# Patient Record
Sex: FEMALE | Race: WHITE | HISPANIC OR LATINO | Employment: FULL TIME | URBAN - METROPOLITAN AREA
[De-identification: names, ages, dates, MRNs, and addresses within clinical notes are randomized per-mention and may not be internally consistent; named-entity substitution may affect disease eponyms.]

---

## 2020-02-11 ENCOUNTER — APPOINTMENT (OUTPATIENT)
Dept: INTERPRETER SERVICES | Facility: CLINIC | Age: 35
End: 2020-02-11
Payer: COMMERCIAL

## 2020-02-13 RX ORDER — METFORMIN HCL 500 MG
1000 TABLET, EXTENDED RELEASE 24 HR ORAL 2 TIMES DAILY WITH MEALS
Status: ON HOLD | COMMUNITY
Start: 2019-09-11 | End: 2022-03-15

## 2020-02-13 ASSESSMENT — MIFFLIN-ST. JEOR: SCORE: 1398.49

## 2020-02-14 ENCOUNTER — HOSPITAL ENCOUNTER (OUTPATIENT)
Facility: CLINIC | Age: 35
Discharge: HOME | End: 2020-02-14
Attending: OBSTETRICS & GYNECOLOGY | Admitting: OBSTETRICS & GYNECOLOGY
Payer: COMMERCIAL

## 2020-02-14 ENCOUNTER — ANESTHESIA EVENT (OUTPATIENT)
Dept: SURGERY | Facility: CLINIC | Age: 35
End: 2020-02-14
Payer: COMMERCIAL

## 2020-02-14 ENCOUNTER — HOSPITAL ENCOUNTER (OUTPATIENT)
Dept: ULTRASOUND IMAGING | Facility: CLINIC | Age: 35
End: 2020-02-14
Attending: OBSTETRICS & GYNECOLOGY | Admitting: OBSTETRICS & GYNECOLOGY
Payer: COMMERCIAL

## 2020-02-14 ENCOUNTER — ANESTHESIA (OUTPATIENT)
Dept: SURGERY | Facility: CLINIC | Age: 35
End: 2020-02-14
Payer: COMMERCIAL

## 2020-02-14 VITALS
RESPIRATION RATE: 16 BRPM | HEIGHT: 61 IN | BODY MASS INDEX: 31.72 KG/M2 | HEART RATE: 58 BPM | SYSTOLIC BLOOD PRESSURE: 110 MMHG | DIASTOLIC BLOOD PRESSURE: 70 MMHG | WEIGHT: 168 LBS | OXYGEN SATURATION: 99 % | TEMPERATURE: 99.5 F

## 2020-02-14 DIAGNOSIS — Z98.890 S/P DILATION AND CURETTAGE: Primary | ICD-10-CM

## 2020-02-14 DIAGNOSIS — O02.1 MISSED AB: ICD-10-CM

## 2020-02-14 LAB
ABO + RH BLD: NORMAL
ABO + RH BLD: NORMAL
BLD GP AB SCN SERPL QL: NORMAL
BLOOD BANK CMNT PATIENT-IMP: NORMAL
GLUCOSE BLDC GLUCOMTR-MCNC: 82 MG/DL (ref 70–99)
GLUCOSE BLDC GLUCOMTR-MCNC: 99 MG/DL (ref 70–99)
SPECIMEN EXP DATE BLD: NORMAL

## 2020-02-14 PROCEDURE — 40000985 US INTRAOPERATIVE: Mod: TC

## 2020-02-14 PROCEDURE — 86850 RBC ANTIBODY SCREEN: CPT | Performed by: OBSTETRICS & GYNECOLOGY

## 2020-02-14 PROCEDURE — 27210794 ZZH OR GENERAL SUPPLY STERILE: Performed by: OBSTETRICS & GYNECOLOGY

## 2020-02-14 PROCEDURE — 86900 BLOOD TYPING SEROLOGIC ABO: CPT | Performed by: OBSTETRICS & GYNECOLOGY

## 2020-02-14 PROCEDURE — 25000125 ZZHC RX 250: Performed by: OBSTETRICS & GYNECOLOGY

## 2020-02-14 PROCEDURE — 88305 TISSUE EXAM BY PATHOLOGIST: CPT | Performed by: OBSTETRICS & GYNECOLOGY

## 2020-02-14 PROCEDURE — 71000012 ZZH RECOVERY PHASE 1 LEVEL 1 FIRST HR: Performed by: OBSTETRICS & GYNECOLOGY

## 2020-02-14 PROCEDURE — 25800030 ZZH RX IP 258 OP 636: Performed by: ANESTHESIOLOGY

## 2020-02-14 PROCEDURE — 71000027 ZZH RECOVERY PHASE 2 EACH 15 MINS: Performed by: OBSTETRICS & GYNECOLOGY

## 2020-02-14 PROCEDURE — 37000009 ZZH ANESTHESIA TECHNICAL FEE, EACH ADDTL 15 MIN: Performed by: OBSTETRICS & GYNECOLOGY

## 2020-02-14 PROCEDURE — 36000052 ZZH SURGERY LEVEL 2 EA 15 ADDTL MIN: Performed by: OBSTETRICS & GYNECOLOGY

## 2020-02-14 PROCEDURE — 36000050 ZZH SURGERY LEVEL 2 1ST 30 MIN: Performed by: OBSTETRICS & GYNECOLOGY

## 2020-02-14 PROCEDURE — 25000128 H RX IP 250 OP 636: Performed by: NURSE ANESTHETIST, CERTIFIED REGISTERED

## 2020-02-14 PROCEDURE — 88305 TISSUE EXAM BY PATHOLOGIST: CPT | Mod: 26 | Performed by: OBSTETRICS & GYNECOLOGY

## 2020-02-14 PROCEDURE — 25000132 ZZH RX MED GY IP 250 OP 250 PS 637: Performed by: OBSTETRICS & GYNECOLOGY

## 2020-02-14 PROCEDURE — 86901 BLOOD TYPING SEROLOGIC RH(D): CPT | Performed by: OBSTETRICS & GYNECOLOGY

## 2020-02-14 PROCEDURE — 36415 COLL VENOUS BLD VENIPUNCTURE: CPT | Performed by: OBSTETRICS & GYNECOLOGY

## 2020-02-14 PROCEDURE — 37000008 ZZH ANESTHESIA TECHNICAL FEE, 1ST 30 MIN: Performed by: OBSTETRICS & GYNECOLOGY

## 2020-02-14 PROCEDURE — 82962 GLUCOSE BLOOD TEST: CPT

## 2020-02-14 PROCEDURE — 25000125 ZZHC RX 250: Performed by: ANESTHESIOLOGY

## 2020-02-14 PROCEDURE — 40000306 ZZH STATISTIC PRE PROC ASSESS II: Performed by: OBSTETRICS & GYNECOLOGY

## 2020-02-14 PROCEDURE — 00000159 ZZHCL STATISTIC H-SEND OUTS PREP: Performed by: OBSTETRICS & GYNECOLOGY

## 2020-02-14 RX ORDER — ALBUTEROL SULFATE 0.83 MG/ML
2.5 SOLUTION RESPIRATORY (INHALATION) EVERY 4 HOURS PRN
Status: DISCONTINUED | OUTPATIENT
Start: 2020-02-14 | End: 2020-02-14 | Stop reason: HOSPADM

## 2020-02-14 RX ORDER — SODIUM CHLORIDE, SODIUM LACTATE, POTASSIUM CHLORIDE, CALCIUM CHLORIDE 600; 310; 30; 20 MG/100ML; MG/100ML; MG/100ML; MG/100ML
INJECTION, SOLUTION INTRAVENOUS CONTINUOUS
Status: DISCONTINUED | OUTPATIENT
Start: 2020-02-14 | End: 2020-02-14 | Stop reason: HOSPADM

## 2020-02-14 RX ORDER — ONDANSETRON 4 MG/1
4 TABLET, ORALLY DISINTEGRATING ORAL
Status: DISCONTINUED | OUTPATIENT
Start: 2020-02-14 | End: 2020-02-14 | Stop reason: HOSPADM

## 2020-02-14 RX ORDER — DOXYCYCLINE 100 MG/10ML
100 INJECTION, POWDER, LYOPHILIZED, FOR SOLUTION INTRAVENOUS
Status: COMPLETED | OUTPATIENT
Start: 2020-02-14 | End: 2020-02-14

## 2020-02-14 RX ORDER — AMOXICILLIN 250 MG
1-2 CAPSULE ORAL 2 TIMES DAILY PRN
Qty: 10 TABLET | Refills: 1 | Status: ON HOLD | OUTPATIENT
Start: 2020-02-14 | End: 2022-03-15

## 2020-02-14 RX ORDER — ONDANSETRON 2 MG/ML
INJECTION INTRAMUSCULAR; INTRAVENOUS PRN
Status: DISCONTINUED | OUTPATIENT
Start: 2020-02-14 | End: 2020-02-14

## 2020-02-14 RX ORDER — ACETAMINOPHEN 325 MG/1
975 TABLET ORAL EVERY 6 HOURS PRN
Qty: 30 TABLET | Refills: 1 | Status: ON HOLD | OUTPATIENT
Start: 2020-02-14 | End: 2022-03-18

## 2020-02-14 RX ORDER — HYDRALAZINE HYDROCHLORIDE 20 MG/ML
2.5-5 INJECTION INTRAMUSCULAR; INTRAVENOUS EVERY 10 MIN PRN
Status: DISCONTINUED | OUTPATIENT
Start: 2020-02-14 | End: 2020-02-14 | Stop reason: HOSPADM

## 2020-02-14 RX ORDER — NALOXONE HYDROCHLORIDE 0.4 MG/ML
.1-.4 INJECTION, SOLUTION INTRAMUSCULAR; INTRAVENOUS; SUBCUTANEOUS
Status: DISCONTINUED | OUTPATIENT
Start: 2020-02-14 | End: 2020-02-14 | Stop reason: HOSPADM

## 2020-02-14 RX ORDER — OXYCODONE HYDROCHLORIDE 5 MG/1
5 TABLET ORAL
Status: COMPLETED | OUTPATIENT
Start: 2020-02-14 | End: 2020-02-14

## 2020-02-14 RX ORDER — PROPOFOL 10 MG/ML
INJECTION, EMULSION INTRAVENOUS PRN
Status: DISCONTINUED | OUTPATIENT
Start: 2020-02-14 | End: 2020-02-14

## 2020-02-14 RX ORDER — MEPERIDINE HYDROCHLORIDE 25 MG/ML
12.5 INJECTION INTRAMUSCULAR; INTRAVENOUS; SUBCUTANEOUS
Status: DISCONTINUED | OUTPATIENT
Start: 2020-02-14 | End: 2020-02-14 | Stop reason: HOSPADM

## 2020-02-14 RX ORDER — ONDANSETRON 2 MG/ML
4 INJECTION INTRAMUSCULAR; INTRAVENOUS EVERY 30 MIN PRN
Status: DISCONTINUED | OUTPATIENT
Start: 2020-02-14 | End: 2020-02-14 | Stop reason: HOSPADM

## 2020-02-14 RX ORDER — FENTANYL CITRATE 50 UG/ML
25-50 INJECTION, SOLUTION INTRAMUSCULAR; INTRAVENOUS
Status: DISCONTINUED | OUTPATIENT
Start: 2020-02-14 | End: 2020-02-14 | Stop reason: HOSPADM

## 2020-02-14 RX ORDER — KETOROLAC TROMETHAMINE 30 MG/ML
INJECTION, SOLUTION INTRAMUSCULAR; INTRAVENOUS PRN
Status: DISCONTINUED | OUTPATIENT
Start: 2020-02-14 | End: 2020-02-14

## 2020-02-14 RX ORDER — ONDANSETRON 4 MG/1
4 TABLET, ORALLY DISINTEGRATING ORAL EVERY 30 MIN PRN
Status: DISCONTINUED | OUTPATIENT
Start: 2020-02-14 | End: 2020-02-14 | Stop reason: HOSPADM

## 2020-02-14 RX ORDER — ONDANSETRON 4 MG/1
4-8 TABLET, ORALLY DISINTEGRATING ORAL EVERY 8 HOURS PRN
Qty: 4 TABLET | Refills: 0 | Status: ON HOLD | OUTPATIENT
Start: 2020-02-14 | End: 2022-03-15

## 2020-02-14 RX ORDER — METOPROLOL TARTRATE 1 MG/ML
1-2 INJECTION, SOLUTION INTRAVENOUS EVERY 5 MIN PRN
Status: DISCONTINUED | OUTPATIENT
Start: 2020-02-14 | End: 2020-02-14 | Stop reason: HOSPADM

## 2020-02-14 RX ORDER — KETOROLAC TROMETHAMINE 30 MG/ML
30 INJECTION, SOLUTION INTRAMUSCULAR; INTRAVENOUS EVERY 6 HOURS PRN
Status: DISCONTINUED | OUTPATIENT
Start: 2020-02-14 | End: 2020-02-14 | Stop reason: HOSPADM

## 2020-02-14 RX ORDER — FENTANYL CITRATE 50 UG/ML
INJECTION, SOLUTION INTRAMUSCULAR; INTRAVENOUS PRN
Status: DISCONTINUED | OUTPATIENT
Start: 2020-02-14 | End: 2020-02-14

## 2020-02-14 RX ORDER — OXYCODONE HYDROCHLORIDE 5 MG/1
5 TABLET ORAL EVERY 4 HOURS PRN
Status: DISCONTINUED | OUTPATIENT
Start: 2020-02-14 | End: 2020-02-14 | Stop reason: HOSPADM

## 2020-02-14 RX ORDER — ACETAMINOPHEN 325 MG/1
975 TABLET ORAL ONCE
Status: COMPLETED | OUTPATIENT
Start: 2020-02-14 | End: 2020-02-14

## 2020-02-14 RX ORDER — LIDOCAINE 40 MG/G
CREAM TOPICAL
Status: DISCONTINUED | OUTPATIENT
Start: 2020-02-14 | End: 2020-02-14 | Stop reason: HOSPADM

## 2020-02-14 RX ORDER — FENTANYL CITRATE 50 UG/ML
25-50 INJECTION, SOLUTION INTRAMUSCULAR; INTRAVENOUS EVERY 5 MIN PRN
Status: DISCONTINUED | OUTPATIENT
Start: 2020-02-14 | End: 2020-02-14 | Stop reason: HOSPADM

## 2020-02-14 RX ORDER — HYDROXYZINE HYDROCHLORIDE 25 MG/1
25 TABLET, FILM COATED ORAL
Status: DISCONTINUED | OUTPATIENT
Start: 2020-02-14 | End: 2020-02-14 | Stop reason: HOSPADM

## 2020-02-14 RX ADMIN — LIDOCAINE HYDROCHLORIDE 50 MG: 10 INJECTION, SOLUTION EPIDURAL; INFILTRATION; INTRACAUDAL; PERINEURAL at 10:51

## 2020-02-14 RX ADMIN — MIDAZOLAM 2 MG: 1 INJECTION INTRAMUSCULAR; INTRAVENOUS at 10:45

## 2020-02-14 RX ADMIN — OXYCODONE HYDROCHLORIDE 5 MG: 5 TABLET ORAL at 11:54

## 2020-02-14 RX ADMIN — FENTANYL CITRATE 100 MCG: 50 INJECTION, SOLUTION INTRAMUSCULAR; INTRAVENOUS at 10:51

## 2020-02-14 RX ADMIN — PROPOFOL 200 MG: 10 INJECTION, EMULSION INTRAVENOUS at 10:51

## 2020-02-14 RX ADMIN — DOXYCYCLINE 100 MG: 100 INJECTION, POWDER, LYOPHILIZED, FOR SOLUTION INTRAVENOUS at 10:45

## 2020-02-14 RX ADMIN — KETOROLAC TROMETHAMINE 30 MG: 30 INJECTION, SOLUTION INTRAMUSCULAR at 11:15

## 2020-02-14 RX ADMIN — ACETAMINOPHEN 975 MG: 325 TABLET, FILM COATED ORAL at 09:15

## 2020-02-14 RX ADMIN — ONDANSETRON HYDROCHLORIDE 4 MG: 2 INJECTION, SOLUTION INTRAVENOUS at 11:11

## 2020-02-14 RX ADMIN — SODIUM CHLORIDE, POTASSIUM CHLORIDE, SODIUM LACTATE AND CALCIUM CHLORIDE: 600; 310; 30; 20 INJECTION, SOLUTION INTRAVENOUS at 10:45

## 2020-02-14 ASSESSMENT — MIFFLIN-ST. JEOR: SCORE: 1389.42

## 2020-02-14 NOTE — ANESTHESIA POSTPROCEDURE EVALUATION
Patient: Amber JENNINGS Adamaris    Procedure(s):  DILATION AND CURETTAGE, UTERUS, USING SUCTION, WITH ULTRASOUND GUIDANCE    Diagnosis:Missed ab [O02.1]  Abnormal human chorionic gonadotropin (hCG) [R79.9]  Diagnosis Additional Information: No value filed.    Anesthesia Type:  General, LMA    Note:  Anesthesia Post Evaluation    Patient location during evaluation: PACU  Patient participation: Able to fully participate in evaluation  Level of consciousness: awake  Pain management: adequate  Airway patency: patent  Cardiovascular status: acceptable  Respiratory status: acceptable  Hydration status: acceptable  PONV: controlled     Anesthetic complications: None          Last vitals:  Vitals:    02/14/20 1200 02/14/20 1219 02/14/20 1247   BP: 111/71 113/70 110/70   Pulse:  58    Resp: 18 16 16   Temp:  97.5  F (36.4  C) 99.5  F (37.5  C)   SpO2: 100% 100% 99%         Electronically Signed By: Gold Pearson MD  February 14, 2020  2:14 PM

## 2020-02-14 NOTE — OP NOTE
"Operative Note   Name: Amber Bailon  MRN:[id number]  : 1985  Date of Surgery: 2020    Pre-operative Diagnosis:   - Abnormal HCG levels and US findings concerning for molar pregnancy   - T2 DM on insulin  - Obesity   - PCOS      Post-operative Diagnosis: Same, s/p below stated procedures    Procedure(s):   - EUA  - Suction dilation and curettage under US guidance    Surgeon: Dunia Aranda MD     Anesthesia: GETA  EBL: 20 mL   Urine Output: 200 mL clear urine   Fluids: 600 mL crystalloid    Specimens: Products of conception  Complications: None apparent    Findings:   - US prior to the case revealed heterogeneous thickening of the endometrium with multiple cystic spaces, no evidence of GS, YS or CRL to suggest IUP vs. Partial molar pregnancy.   - EUA revealed enlarged uterus to ~12 weeks in size.     Indications: Amber Bailon is a 34 y.o.  female with abnormally elevated beta HCG (79,963 on >142,855 on ) in the context of an ultrasound suspicious for molar pregnancy (completed on  with impression of \"Diffuse heterogeneous thickening of the endometrium up to 4.1 cm AP diameter. Multiple cystic spaces within this. Question molar pregnancy\").  Given these findings, I did discuss my concern with the patient and recommended a suction dilation and curettage under ultrasound guidance. We talked about the risks and benefits of the procedure which include but are not limited to excessive bleeding, the patient states she is amenable to blood transfusion, as well as infection and partial or complete uterine perforation.  I did discuss that pending final pathology, if this is consistent with a molar pregnancy, that close follow-up is going to be necessary and will include weekly HCGs, which she stated understanding. I did discuss with the patient and her  that contraception is vital important following her surgery, and till the beta HCG trans down to 0 and stays down in the " negative range. I did discuss with the patient following, there is no contraindication for her to attempt pregnancy, again reiterated the importance of not having a concurrent possibly new pregnancy with a resolving molar pregnancy. The patient and her  would like to think about contraception options, otherwise they state that he can use condoms on a regular basis.  She was counseled on the risks, benefits, and alternatives of the procedure, and she consented.     Procedure: The patient was taken to the operating room where she underwent LMA anesthesia without difficulty. She was placed in the dorsal lithotomy position. An examination was done and it was noted that her uterus was anteverted. She was prepped and draped in the usual sterile fashion. A sterile speculum was inserted into the vagina.  An Allis clamp was placed on the anterior cervical lip. The cervix was serially dilated to 9mm using Hegar dilators. A 9mm suction curette was advanced gently to the uterine fundus under US guidance. The suction device was activated and the curette rotated to clear the uterus of products of conception. Two more passes of the suction curettage were performed. A sharp curettage was performed with a gritty texture noted in the uterine cavity. One last pass with 7mm suction curette was made.  Dopper using US was used on the endometrial and was not concerning for any abnormal or residual flow. There was minimal bleeding noted and the Allis clamp was removed with good hemostasis. The patient tolerated the procedure well and was taken to the recovery area in stable condition.     Dunia Campos MD   Pager: [phone]   February 14, 2020

## 2020-02-14 NOTE — DISCHARGE INSTRUCTIONS
Maximum acetaminophen (Tylenol) dose from all sources should not exceed 4 grams (4000 mg) per day.  You had 975mg of tylenol at 9:15am.  Do not take tylenol products until after 3:15pm    GENERAL ANESTHESIA OR SEDATION ADULT DISCHARGE INSTRUCTIONS   SPECIAL PRECAUTIONS FOR 24 HOURS AFTER SURGERY    IT IS NOT UNUSUAL TO FEEL LIGHT-HEADED OR FAINT, UP TO 24 HOURS AFTER SURGERY OR WHILE TAKING PAIN MEDICATION.  IF YOU HAVE THESE SYMPTOMS; SIT FOR A FEW MINUTES BEFORE STANDING AND HAVE SOMEONE ASSIST YOU WHEN YOU GET UP TO WALK OR USE THE BATHROOM.    YOU SHOULD REST AND RELAX FOR THE NEXT 24 HOURS AND YOU MUST MAKE ARRANGEMENTS TO HAVE SOMEONE STAY WITH YOU FOR AT LEAST 24 HOURS AFTER YOUR DISCHARGE.  AVOID HAZARDOUS AND STRENUOUS ACTIVITIES.  DO NOT MAKE IMPORTANT DECISIONS FOR 24 HOURS.    DO NOT DRIVE ANY VEHICLE OR OPERATE MECHANICAL EQUIPMENT FOR 24 HOURS FOLLOWING THE END OF YOUR SURGERY.  EVEN THOUGH YOU MAY FEEL NORMAL, YOUR REACTIONS MAY BE AFFECTED BY THE MEDICATION YOU HAVE RECEIVED.    DO NOT DRINK ALCOHOLIC BEVERAGES FOR 24 HOURS FOLLOWING YOUR SURGERY.    DRINK CLEAR LIQUIDS (APPLE JUICE, GINGER ALE, 7-UP, BROTH, ETC.).  PROGRESS TO YOUR REGULAR DIET AS YOU FEEL ABLE.    YOU MAY HAVE A DRY MOUTH, A SORE THROAT, MUSCLES ACHES OR TROUBLE SLEEPING.  THESE SHOULD GO AWAY AFTER 24 HOURS.    CALL YOUR DOCTOR FOR ANY OF THE FOLLOWING:  SIGNS OF INFECTION (FEVER, GROWING TENDERNESS AT THE SURGERY SITE, A LARGE AMOUNT OF DRAINAGE OR BLEEDING, SEVERE PAIN, FOUL-SMELLING DRAINAGE, REDNESS OR SWELLING.    IT HAS BEEN OVER 8 TO 10 HOURS SINCE SURGERY AND YOU ARE STILL NOT ABLE TO URINATE (PASS WATER).     You received Toradol, an IV form of ibuprofen (Motrin) at 11:15am.  Do not take any ibuprofen products until 5:15 pm if needed.  You had 1 OXYCODONE at 1154 am.     Loss Discharge Instructions   We recommend you see your provider to check on your physical and emotional recovery, and to walk through your  "experience within 1-2 weeks.  Any further recommendations for follow up will be discussed with your provider at that time.       The Blues and Grief  After delivery you will experience hormone changes and strong emotions, often referred to as the \"baby blues\".  You may find yourself easily upset, tearful or angry.  In addition, you will be grieving for your own loss and may feel terribly tired and not want to face friends, family or new babies.    Your feelings will be hard to predict during this time.  You may have many ups and downs.  Be kind and patient with yourself.    No two people grieve the same way.  This is true of spouses and partners.  Try to have open communication, but don't depend solely on each other for support.  Reach out to friends, family, clergy and your health care providers.  You don't have to handle this alone.    Normal grief after a pregnancy or  loss often lasts for weeks or months.  Over time, you will have more good days than bad ones.  The first year is usually the hardest as you face significant dates and events for the first time.    At first, your sadness or anxiety may keep you from sleeping, eating, being with others or getting out of the house.  If, after a week, you aren't able to take care of basic daily tasks, please call your care provider right away.  It could be more serious than the blues or grief.  Going back to work:  Even though you did not bring home a living baby, you and your partner have a right to any family and bereavement leave benefits your employer offers.  When you do return to work, be prepared for some adjustment time.    Call your health care provider if you have any of these symptoms:  You soak a sanitary pad with blood within 1 hour, or you see blood clots larger than a golf ball.  Bleeding that lasts more than 6 weeks.  You have vaginal discharge that smells bad.   A fever above 100.4 F (38 C), with or without chills  Severe, pain, cramping or " tenderness in your lower belly area.  If you have pain that increases or does not go away from an episiotomy or perineal tear  Increased pain, swelling, redness or fluid around your stitches.  A need to urinate more frequently (use the toilet more often), more urgently (use the toilet very quickly), or it burns when you urinate.  Redness, swelling or pain around a vein in your leg.  Problems with coping with sadness, anxiety, or depression.  Your breasts are engorged (hard and swollen), red and very tender and you have a fever.   If you have nausea and vomiting.  If you have chest pain and cough or are gasping for air.  You have questions or concerns after you return home.    Keep your hands clean:  Always wash your hands before touching your perineal area and stitches.  This helps reduce your risk of infection.  If your hands aren't dirty, you may use an alcohol hand-rub to clean your hands. Keep your nails clean and short.

## 2020-02-14 NOTE — ANESTHESIA CARE TRANSFER NOTE
Patient: Amber Bailon    Procedure(s):  DILATION AND CURETTAGE, UTERUS, USING SUCTION, WITH ULTRASOUND GUIDANCE    Diagnosis: Missed ab [O02.1]  Abnormal human chorionic gonadotropin (hCG) [R79.9]  Diagnosis Additional Information: No value filed.    Anesthesia Type:   General, LMA     Note:    Patient transferred to:PACU  Comments: Pt spont resps LMA removed to PACU VSS report to RNHandoff Report: Identifed the Patient, Identified the Reponsible Provider, Reviewed the pertinent medical history, Discussed the surgical course, Reviewed Intra-OP anesthesia mangement and issues during anesthesia, Set expectations for post-procedure period and Allowed opportunity for questions and acknowledgement of understanding      Vitals: (Last set prior to Anesthesia Care Transfer)    CRNA VITALS  2/14/2020 1054 - 2/14/2020 1129      2/14/2020             Pulse:  69    SpO2:  97 %                Electronically Signed By: MAURILIO Willis CRNA  February 14, 2020  11:29 AM

## 2020-02-14 NOTE — ANESTHESIA PREPROCEDURE EVALUATION
"Anesthesia Pre-Procedure Evaluation    Patient: Amber Bailon   MRN: [id number] : 1985          Preoperative Diagnosis: Missed ab [O02.1]  Abnormal human chorionic gonadotropin (hCG) [R79.9]    Procedure(s):  DILATION AND CURETTAGE, UTERUS, USING SUCTION, WITH ULTRASOUND GUIDANCE    Past Medical History:   Diagnosis Date     Diabetes (H)      Miscarriage      Past Surgical History:   Procedure Laterality Date     ORTHOPEDIC SURGERY      right wrist surgery     Anesthesia Evaluation     . Pt has had prior anesthetic.            ROS/MED HX    ENT/Pulmonary:  - neg pulmonary ROS    (-) sleep apnea   Neurologic:       Cardiovascular:  - neg cardiovascular ROS       METS/Exercise Tolerance:     Hematologic:         Musculoskeletal:         GI/Hepatic:        (-) GERD   Renal/Genitourinary:         Endo:     (+) type II DM Using insulin Obesity, .      Psychiatric:         Infectious Disease:         Malignancy:         Other:                          Physical Exam      Airway   Mallampati: II  TM distance: >3 FB  Neck ROM: full    Dental     Cardiovascular   Rhythm and rate: regular and normal      Pulmonary    breath sounds clear to auscultation            Lab Results   Component Value Date    HCG Negative 2009       Preop Vitals  BP Readings from Last 3 Encounters:   No data found for BP    Pulse Readings from Last 3 Encounters:   No data found for Pulse      Resp Readings from Last 3 Encounters:   No data found for Resp    SpO2 Readings from Last 3 Encounters:   No data found for SpO2      Temp Readings from Last 1 Encounters:   No data found for Temp    Ht Readings from Last 1 Encounters:   20 1.549 m (5' 1\")      Wt Readings from Last 1 Encounters:   20 77.1 kg (170 lb)    Estimated body mass index is 32.12 kg/m  as calculated from the following:    Height as of this encounter: 1.549 m (5' 1\").    Weight as of this encounter: 77.1 kg (170 lb).       Anesthesia Plan      History & " Physical Review  History and physical reviewed and following examination; no interval change.    ASA Status:  3 .    NPO Status:  > 8 hours    Plan for General and LMA with Intravenous and Propofol induction. Maintenance will be Balanced.    PONV prophylaxis:  Ondansetron (or other 5HT-3)       Postoperative Care  Postoperative pain management:  IV analgesics, Oral pain medications and Multi-modal analgesia.      Consents  Anesthetic plan, risks, benefits and alternatives discussed with:  Patient..                 Gold Pearson MD                    .

## 2020-02-17 LAB — COPATH REPORT: NORMAL

## 2021-09-10 LAB
HEPATITIS B SURFACE ANTIGEN (EXTERNAL): NONREACTIVE
HIV1+2 AB SERPL QL IA: NONREACTIVE
RUBELLA ANTIBODY IGG (EXTERNAL): NORMAL
TREPONEMA PALLIDUM ANTIBODY (EXTERNAL): NONREACTIVE

## 2022-03-15 ENCOUNTER — HOSPITAL ENCOUNTER (OUTPATIENT)
Facility: CLINIC | Age: 37
Discharge: HOME | End: 2022-03-15
Attending: OBSTETRICS & GYNECOLOGY | Admitting: OBSTETRICS & GYNECOLOGY
Payer: COMMERCIAL

## 2022-03-15 VITALS
RESPIRATION RATE: 15 BRPM | BODY MASS INDEX: 34.93 KG/M2 | DIASTOLIC BLOOD PRESSURE: 77 MMHG | TEMPERATURE: 99 F | SYSTOLIC BLOOD PRESSURE: 133 MMHG | WEIGHT: 185 LBS | HEIGHT: 61 IN

## 2022-03-15 PROBLEM — Z92.89 H/O FETAL BIOPHYSICAL PROFILE: Status: ACTIVE | Noted: 2022-03-15

## 2022-03-15 PROBLEM — E11.9 TYPE 2 DIABETES MELLITUS, WITH LONG-TERM CURRENT USE OF INSULIN (H): Status: ACTIVE | Noted: 2022-03-15

## 2022-03-15 PROBLEM — Z79.4 TYPE 2 DIABETES MELLITUS, WITH LONG-TERM CURRENT USE OF INSULIN (H): Status: ACTIVE | Noted: 2022-03-15

## 2022-03-15 PROBLEM — Z60.3 LANGUAGE BARRIER: Status: ACTIVE | Noted: 2022-03-15

## 2022-03-15 PROBLEM — Z75.8 LANGUAGE BARRIER: Status: ACTIVE | Noted: 2022-03-15

## 2022-03-15 PROBLEM — O36.5930 POOR FETAL GROWTH AFFECTING MANAGEMENT OF MOTHER IN THIRD TRIMESTER: Status: ACTIVE | Noted: 2022-03-15

## 2022-03-15 PROBLEM — Z87.440 PERSONAL HISTORY OF URINARY TRACT INFECTION: Status: ACTIVE | Noted: 2022-03-15

## 2022-03-15 LAB
ALBUMIN UR-MCNC: NEGATIVE MG/DL
APPEARANCE UR: CLEAR
BACTERIA #/AREA URNS HPF: ABNORMAL /HPF
BILIRUB UR QL STRIP: NEGATIVE
COLOR UR AUTO: ABNORMAL
GLUCOSE BLDC GLUCOMTR-MCNC: 100 MG/DL (ref 70–99)
GLUCOSE UR STRIP-MCNC: NEGATIVE MG/DL
HGB UR QL STRIP: NEGATIVE
KETONES UR STRIP-MCNC: NEGATIVE MG/DL
LEUKOCYTE ESTERASE UR QL STRIP: ABNORMAL
NITRATE UR QL: NEGATIVE
PH UR STRIP: 6.5 [PH] (ref 5–7)
RBC URINE: 1 /HPF
SP GR UR STRIP: 1.01 (ref 1–1.03)
SQUAMOUS EPITHELIAL: 9 /HPF
UROBILINOGEN UR STRIP-MCNC: NORMAL MG/DL
WBC URINE: 7 /HPF

## 2022-03-15 PROCEDURE — 59025 FETAL NON-STRESS TEST: CPT

## 2022-03-15 PROCEDURE — 82962 GLUCOSE BLOOD TEST: CPT

## 2022-03-15 PROCEDURE — 999N000070 HC STATISTIC GLUCOSE MONITORING

## 2022-03-15 PROCEDURE — 87653 STREP B DNA AMP PROBE: CPT | Performed by: OBSTETRICS & GYNECOLOGY

## 2022-03-15 PROCEDURE — 81001 URINALYSIS AUTO W/SCOPE: CPT | Performed by: OBSTETRICS & GYNECOLOGY

## 2022-03-15 RX ORDER — METOCLOPRAMIDE HYDROCHLORIDE 5 MG/ML
10 INJECTION INTRAMUSCULAR; INTRAVENOUS EVERY 6 HOURS PRN
Status: DISCONTINUED | OUTPATIENT
Start: 2022-03-15 | End: 2022-03-15 | Stop reason: HOSPADM

## 2022-03-15 RX ORDER — METOCLOPRAMIDE 10 MG/1
10 TABLET ORAL EVERY 6 HOURS PRN
Status: DISCONTINUED | OUTPATIENT
Start: 2022-03-15 | End: 2022-03-15 | Stop reason: HOSPADM

## 2022-03-15 RX ORDER — ONDANSETRON 4 MG/1
4 TABLET, ORALLY DISINTEGRATING ORAL EVERY 6 HOURS PRN
Status: DISCONTINUED | OUTPATIENT
Start: 2022-03-15 | End: 2022-03-15 | Stop reason: HOSPADM

## 2022-03-15 RX ORDER — PROCHLORPERAZINE MALEATE 10 MG
10 TABLET ORAL EVERY 6 HOURS PRN
Status: DISCONTINUED | OUTPATIENT
Start: 2022-03-15 | End: 2022-03-15 | Stop reason: HOSPADM

## 2022-03-15 RX ORDER — PROCHLORPERAZINE 25 MG
25 SUPPOSITORY, RECTAL RECTAL EVERY 12 HOURS PRN
Status: DISCONTINUED | OUTPATIENT
Start: 2022-03-15 | End: 2022-03-15 | Stop reason: HOSPADM

## 2022-03-15 RX ORDER — VITAMIN A ACETATE, .BETA.-CAROTENE, ASCORBIC ACID, CHOLECALCIFEROL, .ALPHA.-TOCOPHEROL ACETATE, DL-, THIAMINE MONONITRATE, RIBOFLAVIN, NIACINAMIDE, PYRIDOXINE HYDROCHLORIDE, FOLIC ACID, CYANOCOBALAMIN, CALCIUM CARBONATE, FERROUS FUMARATE, ZINC OXIDE, AND CUPRIC OXIDE 2000; 2000; 120; 400; 22; 1.84; 3; 20; 10; 1; 12; 200; 27; 25; 2 [IU]/1; [IU]/1; MG/1; [IU]/1; MG/1; MG/1; MG/1; MG/1; MG/1; MG/1; UG/1; MG/1; MG/1; MG/1; MG/1
1 TABLET ORAL DAILY
COMMUNITY

## 2022-03-15 RX ORDER — ONDANSETRON 2 MG/ML
4 INJECTION INTRAMUSCULAR; INTRAVENOUS EVERY 6 HOURS PRN
Status: DISCONTINUED | OUTPATIENT
Start: 2022-03-15 | End: 2022-03-15 | Stop reason: HOSPADM

## 2022-03-15 RX ORDER — ASPIRIN 81 MG/1
81 TABLET, CHEWABLE ORAL DAILY
Status: ON HOLD | COMMUNITY
End: 2022-03-18

## 2022-03-15 NOTE — DISCHARGE INSTRUCTIONS
Discharge Instruction for Undelivered Patients      You were seen for: Fetal Monitoring  We Consulted:  Dr. Garcia  You had (Test or Medicine):NST- Reactive     Diet:  No Restrictions  Activity:  No Restrictions     Call your provider if you notice:  Swelling in your face or increased swelling in your hands or legs.  Headaches that are not relieved by Tylenol (acetaminophen).  Changes in your vision (blurring: seeing spots or stars.)  Nausea (sick to your stomach) and vomiting (throwing up).   Weight gain of 5 pounds or more per week.  Heartburn that doesn't go away.  Signs of bladder infection: pain when you urinate (use the toilet), need to go more often and more urgently.  The bag of wharton (rupture of membranes) breaks, or you notice leaking in your underwear.  Bright red blood in your underwear.  Abdominal (lower belly) or stomach pain.  For first baby: Contractions (tightening) less than 5 minutes apart for one hour or more.  Increase or change in vaginal discharge (note the color and amount)      Follow-up:  IUGR- Intrauterine Growth Restriction  [facility] made an appointment for you tomorrow 3/15/22 @ 1:15 in [place], it will be a longer visit to due discussing results of BPP ( Biophysical Profile).   Make sure you always have an overnight packed bag with you. With your diagnosis: IUGR, Type 2 diabetes.  The GBS test that was collected- takes a couple of days. Your health team will know your results.     No Changes on your medications  Check your Blood sugars

## 2022-03-15 NOTE — PROVIDER NOTIFICATION
03/15/22 1650   Provider Notification   Provider Name/Title Dr. Garcia   Method of Notification Phone   Request Evaluate - Remote   Notification Reason Status Update   Notified MD of current FHT strip, notified MD that patient reports feeling baby move while being here on monitors. She denies feeling contractions, however, monitors have picked up at least 2. She denies any leaking of fluid, vaginal bleeding, blurred vision or shortness of breath.   Notified MD that patient's  went home for a little bit, but patient feels like maybe an Ipad  would help for understanding medical stuff.   MD not on unit but will come up and view strip to sign off on it. And will come and see patient. Will get an ipad to have a  for patient and  to understand discharge instructions.

## 2022-03-15 NOTE — PLAN OF CARE
.Data: Patient presented to [facility]: 3/15/2022  2:19 PM.  Reason for maternal/fetal assessment is decreased fetal movement & fetal heart rate irregularity at [facility] MFM appointment at today.   BPP score was not available at this time.    Patient is a .  Prenatal record will be reviewed. Pregnancy  has been complicated by Type 2 diabetes on insulin.  Gestational Age 35w5d. VSS. Fetal movement decreased since this am. Patient denies uterine contractions, leaking of vaginal fluid/rupture of membranes, vaginal bleeding, abdominal pain, pelvic pressure, nausea, vomiting, headache, visual disturbances, epigastric or URQ pain, significant edema. Support person is present.   Action: Verbal consent for EFM. Triage assessment completed. Bill of rights reviewed.  Plan is to monitor infant for 2 hours and update MD, evaluate maternal glucose & obtain a GBS culture.    FHR was normal baseline but absent 15 beat by 15 seconds accel.   No contractions were noted.  POC glucose was 100 at 1445.  Patient last ate at 1100.  She stated that she had NPH 13 Units at 950 & Regular 18 unit(s) at 1030.  A UA & vaginal GBS swab were obtained and sent.    Response: Dr Robbie Garcia at the bedside to discuss POC & review EFM.  Patient verbalized agreement with plan.     Bedside handoff given to Melita FISH and baldev turned over at 1515.

## 2022-03-15 NOTE — PROGRESS NOTES
"[facility]  OB Triage Note    CC: Decreased FM    HPI: Ms. Amber Bailon is a 36 year old  at 35w5d, who presents with BPP4/8 off for movement and reflexes during US in [facility] PN office. Patient reports good FM prior to appt. Here on L&D she endorses good FM here. She denies contractions, leaking fluid, vaginal bleeding.    She was sent for prolonged monitoring.    Her pregnancy is complicated by FGR. S/D ratio today was elevated with positive end diastolic flow on scan. Patient's pregnancy is complicated by T2DM, fetal PAC, obesity class 1, Language Barrier (though bilingual), Hx UTI (s/p treatment).    O:  Patient Vitals for the past 24 hrs:   BP Temp Temp src Resp Height Weight   03/15/22 1434 133/77 99  F (37.2  C) Oral 15 1.549 m (5' 1\") 83.9 kg (185 lb)     Gen: Well-appearing, NAD  CV: RRR  Pulm: Non-labored breathing  Abd: Soft, gravid, no TTP  Ext:  LE edema b/l    FHT: Baseline 150, mod variability, + accels, x1 decel non-recurrent  [place]: Contractions every infrquent; 2-3 in 3 hours     Labs:  GBS collected and in process  UA small LE, few bacteria, negative nitrite, no ketones    A/P:  Ms. Amber Bailon is a 36 year old  at 35w5d here with BPP 4/8, FGR, increased S/D, T2DM.    FWB:   - Category 1 FHT, reactive; 2-3 hours monitoring  - BPP office 4/8, off for movement and reflexes   + FM reported by patient before and after [facility] appt   + Audible movement with doppler on L&D  - BPP 6/10 with reactive NST   + Discussed with [facility], Dr. Christie of PN [facility]    MWB  - T2DM   + POC random 100 glucose   + Pt took AM 13 NPH insulin  - Hx UTI   + UA small LE and few bacteria   + U.clx ordered and in process   + A-stx  - Language Barrier   + Bilingual   +  speaks less English than patient   + Professional IPAD  used to discuss plan - questions answered and patient and  agree with plan for f/u (see below).    Dispo: Home with below f/u " appt.    Follow up:   - Appointment scheduled tomorrow, 3/16/22 at [facility] for BPP/YAMILA/Doppler/NST at 1:15pm. Patient encouraged to arrive early.    Robbie Garcia MD DO  March 15, 2022, 5:47 PM

## 2022-03-15 NOTE — PROGRESS NOTES
Data: Patient assessed in the [facility] for NST/fetal surveillance .  Cervical exam not examined.  Membranes intact.  Contractions/uterine assessment completed.  Action:  Presumed adequate fetal oxygenation documented (see flow record). Discharge instructions reviewed.  Patient instructed to report change in fetal movement, vaginal leaking of fluid or bleeding, abdominal pain, or any concerns related to the pregnancy to her nurse/physician.    Response: Orders to discharge home per Dr. Garcia.  Patient verbalized understanding of education and verbalized agreement with plan. Discharged to home at 1800.

## 2022-03-16 ENCOUNTER — HOSPITAL ENCOUNTER (INPATIENT)
Facility: CLINIC | Age: 37
LOS: 3 days | Discharge: HOME | End: 2022-03-19
Attending: STUDENT IN AN ORGANIZED HEALTH CARE EDUCATION/TRAINING PROGRAM | Admitting: STUDENT IN AN ORGANIZED HEALTH CARE EDUCATION/TRAINING PROGRAM
Payer: COMMERCIAL

## 2022-03-16 PROBLEM — O36.5990 FETAL GROWTH RETARDATION, ANTEPARTUM: Status: ACTIVE | Noted: 2022-03-16

## 2022-03-16 LAB
ABO/RH(D): NORMAL
ALT SERPL W P-5'-P-CCNC: 23 U/L (ref 0–50)
ANTIBODY SCREEN: NEGATIVE
AST SERPL W P-5'-P-CCNC: 20 U/L (ref 0–45)
CREAT SERPL-MCNC: 0.51 MG/DL (ref 0.52–1.04)
CREAT UR-MCNC: 42 MG/DL
ERYTHROCYTE [DISTWIDTH] IN BLOOD BY AUTOMATED COUNT: 13.8 % (ref 10–15)
GFR SERPL CREATININE-BSD FRML MDRD: >90 ML/MIN/1.73M2
GLUCOSE BLDC GLUCOMTR-MCNC: 120 MG/DL (ref 70–99)
GLUCOSE BLDC GLUCOMTR-MCNC: 57 MG/DL (ref 70–99)
GP B STREP DNA SPEC QL NAA+PROBE: POSITIVE
HCT VFR BLD AUTO: 38.2 % (ref 35–47)
HGB BLD-MCNC: 12.8 G/DL (ref 11.7–15.7)
MCH RBC QN AUTO: 32.6 PG (ref 26.5–33)
MCHC RBC AUTO-ENTMCNC: 33.5 G/DL (ref 31.5–36.5)
MCV RBC AUTO: 97 FL (ref 78–100)
PLATELET # BLD AUTO: 255 10E3/UL (ref 150–450)
PROT UR-MCNC: 0.18 G/L
PROT/CREAT 24H UR: 0.43 G/G CR (ref 0–0.2)
RBC # BLD AUTO: 3.93 10E6/UL (ref 3.8–5.2)
SARS-COV-2 RNA RESP QL NAA+PROBE: NEGATIVE
SPECIMEN EXPIRATION DATE: NORMAL
WBC # BLD AUTO: 9.1 10E3/UL (ref 4–11)

## 2022-03-16 PROCEDURE — 85027 COMPLETE CBC AUTOMATED: CPT | Performed by: STUDENT IN AN ORGANIZED HEALTH CARE EDUCATION/TRAINING PROGRAM

## 2022-03-16 PROCEDURE — 87635 SARS-COV-2 COVID-19 AMP PRB: CPT | Performed by: STUDENT IN AN ORGANIZED HEALTH CARE EDUCATION/TRAINING PROGRAM

## 2022-03-16 PROCEDURE — 82565 ASSAY OF CREATININE: CPT | Performed by: STUDENT IN AN ORGANIZED HEALTH CARE EDUCATION/TRAINING PROGRAM

## 2022-03-16 PROCEDURE — 86850 RBC ANTIBODY SCREEN: CPT | Performed by: STUDENT IN AN ORGANIZED HEALTH CARE EDUCATION/TRAINING PROGRAM

## 2022-03-16 PROCEDURE — 250N000012 HC RX MED GY IP 250 OP 636 PS 637: Performed by: STUDENT IN AN ORGANIZED HEALTH CARE EDUCATION/TRAINING PROGRAM

## 2022-03-16 PROCEDURE — 250N000013 HC RX MED GY IP 250 OP 250 PS 637: Performed by: STUDENT IN AN ORGANIZED HEALTH CARE EDUCATION/TRAINING PROGRAM

## 2022-03-16 PROCEDURE — 84156 ASSAY OF PROTEIN URINE: CPT | Performed by: STUDENT IN AN ORGANIZED HEALTH CARE EDUCATION/TRAINING PROGRAM

## 2022-03-16 PROCEDURE — 84450 TRANSFERASE (AST) (SGOT): CPT | Performed by: STUDENT IN AN ORGANIZED HEALTH CARE EDUCATION/TRAINING PROGRAM

## 2022-03-16 PROCEDURE — 120N000001 HC R&B MED SURG/OB

## 2022-03-16 PROCEDURE — 86780 TREPONEMA PALLIDUM: CPT | Performed by: STUDENT IN AN ORGANIZED HEALTH CARE EDUCATION/TRAINING PROGRAM

## 2022-03-16 PROCEDURE — 84460 ALANINE AMINO (ALT) (SGPT): CPT | Performed by: STUDENT IN AN ORGANIZED HEALTH CARE EDUCATION/TRAINING PROGRAM

## 2022-03-16 PROCEDURE — 86901 BLOOD TYPING SEROLOGIC RH(D): CPT | Performed by: STUDENT IN AN ORGANIZED HEALTH CARE EDUCATION/TRAINING PROGRAM

## 2022-03-16 RX ORDER — PENICILLIN G 3000000 [IU]/50ML
3 INJECTION, SOLUTION INTRAVENOUS EVERY 4 HOURS
Status: DISCONTINUED | OUTPATIENT
Start: 2022-03-16 | End: 2022-03-17 | Stop reason: HOSPADM

## 2022-03-16 RX ORDER — NICOTINE POLACRILEX 4 MG
15-30 LOZENGE BUCCAL
Status: DISCONTINUED | OUTPATIENT
Start: 2022-03-16 | End: 2022-03-17 | Stop reason: HOSPADM

## 2022-03-16 RX ORDER — OXYTOCIN/0.9 % SODIUM CHLORIDE 30/500 ML
340 PLASTIC BAG, INJECTION (ML) INTRAVENOUS CONTINUOUS PRN
Status: COMPLETED | OUTPATIENT
Start: 2022-03-16 | End: 2022-03-17

## 2022-03-16 RX ORDER — ONDANSETRON 2 MG/ML
4 INJECTION INTRAMUSCULAR; INTRAVENOUS EVERY 6 HOURS PRN
Status: DISCONTINUED | OUTPATIENT
Start: 2022-03-16 | End: 2022-03-17 | Stop reason: HOSPADM

## 2022-03-16 RX ORDER — HYDROXYZINE HYDROCHLORIDE 50 MG/1
100 TABLET, FILM COATED ORAL
Status: DISCONTINUED | OUTPATIENT
Start: 2022-03-16 | End: 2022-03-17 | Stop reason: HOSPADM

## 2022-03-16 RX ORDER — OXYTOCIN 10 [USP'U]/ML
10 INJECTION, SOLUTION INTRAMUSCULAR; INTRAVENOUS
Status: DISCONTINUED | OUTPATIENT
Start: 2022-03-16 | End: 2022-03-17 | Stop reason: HOSPADM

## 2022-03-16 RX ORDER — MISOPROSTOL 200 UG/1
800 TABLET ORAL
Status: DISCONTINUED | OUTPATIENT
Start: 2022-03-16 | End: 2022-03-17 | Stop reason: HOSPADM

## 2022-03-16 RX ORDER — NALOXONE HYDROCHLORIDE 0.4 MG/ML
0.2 INJECTION, SOLUTION INTRAMUSCULAR; INTRAVENOUS; SUBCUTANEOUS
Status: DISCONTINUED | OUTPATIENT
Start: 2022-03-16 | End: 2022-03-17 | Stop reason: HOSPADM

## 2022-03-16 RX ORDER — METOCLOPRAMIDE HYDROCHLORIDE 5 MG/ML
10 INJECTION INTRAMUSCULAR; INTRAVENOUS EVERY 6 HOURS PRN
Status: DISCONTINUED | OUTPATIENT
Start: 2022-03-16 | End: 2022-03-17 | Stop reason: HOSPADM

## 2022-03-16 RX ORDER — OXYTOCIN 10 [USP'U]/ML
10 INJECTION, SOLUTION INTRAMUSCULAR; INTRAVENOUS
Status: DISCONTINUED | OUTPATIENT
Start: 2022-03-16 | End: 2022-03-17

## 2022-03-16 RX ORDER — MISOPROSTOL 100 UG/1
25 TABLET ORAL
Status: DISCONTINUED | OUTPATIENT
Start: 2022-03-16 | End: 2022-03-17 | Stop reason: HOSPADM

## 2022-03-16 RX ORDER — TRANEXAMIC ACID 10 MG/ML
1 INJECTION, SOLUTION INTRAVENOUS EVERY 30 MIN PRN
Status: DISCONTINUED | OUTPATIENT
Start: 2022-03-16 | End: 2022-03-17 | Stop reason: HOSPADM

## 2022-03-16 RX ORDER — NALOXONE HYDROCHLORIDE 0.4 MG/ML
0.4 INJECTION, SOLUTION INTRAMUSCULAR; INTRAVENOUS; SUBCUTANEOUS
Status: DISCONTINUED | OUTPATIENT
Start: 2022-03-16 | End: 2022-03-17 | Stop reason: HOSPADM

## 2022-03-16 RX ORDER — CARBOPROST TROMETHAMINE 250 UG/ML
250 INJECTION, SOLUTION INTRAMUSCULAR
Status: DISCONTINUED | OUTPATIENT
Start: 2022-03-16 | End: 2022-03-17 | Stop reason: HOSPADM

## 2022-03-16 RX ORDER — DEXTROSE MONOHYDRATE 25 G/50ML
25-50 INJECTION, SOLUTION INTRAVENOUS
Status: DISCONTINUED | OUTPATIENT
Start: 2022-03-16 | End: 2022-03-17 | Stop reason: HOSPADM

## 2022-03-16 RX ORDER — LIDOCAINE 40 MG/G
CREAM TOPICAL
Status: DISCONTINUED | OUTPATIENT
Start: 2022-03-16 | End: 2022-03-17 | Stop reason: HOSPADM

## 2022-03-16 RX ORDER — TERBUTALINE SULFATE 1 MG/ML
0.25 INJECTION, SOLUTION SUBCUTANEOUS
Status: DISCONTINUED | OUTPATIENT
Start: 2022-03-16 | End: 2022-03-17 | Stop reason: HOSPADM

## 2022-03-16 RX ORDER — FENTANYL CITRATE 50 UG/ML
100 INJECTION, SOLUTION INTRAMUSCULAR; INTRAVENOUS
Status: DISCONTINUED | OUTPATIENT
Start: 2022-03-16 | End: 2022-03-17 | Stop reason: HOSPADM

## 2022-03-16 RX ORDER — PENICILLIN G POTASSIUM 5000000 [IU]/1
5 INJECTION, POWDER, FOR SOLUTION INTRAMUSCULAR; INTRAVENOUS ONCE
Status: COMPLETED | OUTPATIENT
Start: 2022-03-16 | End: 2022-03-17

## 2022-03-16 RX ORDER — PROCHLORPERAZINE MALEATE 10 MG
10 TABLET ORAL EVERY 6 HOURS PRN
Status: DISCONTINUED | OUTPATIENT
Start: 2022-03-16 | End: 2022-03-17 | Stop reason: HOSPADM

## 2022-03-16 RX ORDER — METHYLERGONOVINE MALEATE 0.2 MG/ML
200 INJECTION INTRAVENOUS
Status: DISCONTINUED | OUTPATIENT
Start: 2022-03-16 | End: 2022-03-17 | Stop reason: HOSPADM

## 2022-03-16 RX ORDER — PROCHLORPERAZINE 25 MG
25 SUPPOSITORY, RECTAL RECTAL EVERY 12 HOURS PRN
Status: DISCONTINUED | OUTPATIENT
Start: 2022-03-16 | End: 2022-03-17 | Stop reason: HOSPADM

## 2022-03-16 RX ORDER — ONDANSETRON 4 MG/1
4 TABLET, ORALLY DISINTEGRATING ORAL EVERY 6 HOURS PRN
Status: DISCONTINUED | OUTPATIENT
Start: 2022-03-16 | End: 2022-03-17 | Stop reason: HOSPADM

## 2022-03-16 RX ORDER — CITRIC ACID/SODIUM CITRATE 334-500MG
30 SOLUTION, ORAL ORAL
Status: DISCONTINUED | OUTPATIENT
Start: 2022-03-16 | End: 2022-03-17 | Stop reason: HOSPADM

## 2022-03-16 RX ORDER — IBUPROFEN 800 MG/1
800 TABLET, FILM COATED ORAL
Status: DISCONTINUED | OUTPATIENT
Start: 2022-03-16 | End: 2022-03-17

## 2022-03-16 RX ORDER — MORPHINE SULFATE 10 MG/ML
10 INJECTION, SOLUTION INTRAMUSCULAR; INTRAVENOUS
Status: DISCONTINUED | OUTPATIENT
Start: 2022-03-16 | End: 2022-03-17 | Stop reason: HOSPADM

## 2022-03-16 RX ORDER — KETOROLAC TROMETHAMINE 30 MG/ML
30 INJECTION, SOLUTION INTRAMUSCULAR; INTRAVENOUS
Status: DISCONTINUED | OUTPATIENT
Start: 2022-03-16 | End: 2022-03-17

## 2022-03-16 RX ORDER — MISOPROSTOL 200 UG/1
400 TABLET ORAL
Status: DISCONTINUED | OUTPATIENT
Start: 2022-03-16 | End: 2022-03-17 | Stop reason: HOSPADM

## 2022-03-16 RX ORDER — OXYTOCIN/0.9 % SODIUM CHLORIDE 30/500 ML
100-340 PLASTIC BAG, INJECTION (ML) INTRAVENOUS CONTINUOUS PRN
Status: DISCONTINUED | OUTPATIENT
Start: 2022-03-16 | End: 2022-03-17

## 2022-03-16 RX ORDER — METOCLOPRAMIDE 10 MG/1
10 TABLET ORAL EVERY 6 HOURS PRN
Status: DISCONTINUED | OUTPATIENT
Start: 2022-03-16 | End: 2022-03-17 | Stop reason: HOSPADM

## 2022-03-16 RX ORDER — SODIUM CHLORIDE, SODIUM LACTATE, POTASSIUM CHLORIDE, CALCIUM CHLORIDE 600; 310; 30; 20 MG/100ML; MG/100ML; MG/100ML; MG/100ML
INJECTION, SOLUTION INTRAVENOUS CONTINUOUS
Status: DISCONTINUED | OUTPATIENT
Start: 2022-03-16 | End: 2022-03-17 | Stop reason: HOSPADM

## 2022-03-16 RX ADMIN — MISOPROSTOL 25 MCG: 100 TABLET ORAL at 18:07

## 2022-03-16 RX ADMIN — INSULIN HUMAN 15 UNITS: 100 INJECTION, SUSPENSION SUBCUTANEOUS at 22:08

## 2022-03-16 RX ADMIN — MISOPROSTOL 25 MCG: 100 TABLET ORAL at 22:06

## 2022-03-16 RX ADMIN — INSULIN ASPART 18 UNITS: 100 INJECTION, SOLUTION INTRAVENOUS; SUBCUTANEOUS at 18:10

## 2022-03-16 RX ADMIN — MISOPROSTOL 25 MCG: 100 TABLET ORAL at 20:02

## 2022-03-16 ASSESSMENT — ACTIVITIES OF DAILY LIVING (ADL)
DOING_ERRANDS_INDEPENDENTLY_DIFFICULTY: NO
FALL_HISTORY_WITHIN_LAST_SIX_MONTHS: NO
CONCENTRATING,_REMEMBERING_OR_MAKING_DECISIONS_DIFFICULTY: NO
WEAR_GLASSES_OR_BLIND: NO
HEARING_DIFFICULTY_OR_DEAF: NO
DIFFICULTY_EATING/SWALLOWING: NO
ADLS_ACUITY_SCORE: 3
DIFFICULTY_COMMUNICATING: NO
TOILETING_ISSUES: NO
DRESSING/BATHING_DIFFICULTY: NO
ADLS_ACUITY_SCORE: 3
ADLS_ACUITY_SCORE: 3
WALKING_OR_CLIMBING_STAIRS_DIFFICULTY: NO
ADLS_ACUITY_SCORE: 3
CHANGE_IN_FUNCTIONAL_STATUS_SINCE_ONSET_OF_CURRENT_ILLNESS/INJURY: NO

## 2022-03-16 NOTE — H&P
[facility] Labor and Delivery History and Physical    Amber Bailon MRN# [id number]   Age: 36 year old YOB: 1985     Date of Admission:  3/16/2022    Primary care provider: Alli Mascorro         HPI:   Amber Bailon is a 36 year old  at 35w6d who presents from an [facility] ultrasound with a BPP 6/10 (fluid, breathing and reactive NST). She was here yesterday for extended monitoring following a 4/8 BPP. Dopplers were elevated today but not reversed and again elevated today. She is undergoing close monitoring with [facility] due to fetal growth restriction. Discussed plan of care with [facility], Dr. Pandey, who recommended a CST versus starting an induction with low threshold to stop if no cervical progression and reassuring FHT. I discussed options with Amber and she is comfortable proceeding with IOL given nonreassuring fetal surveillance x2. Notes normal fetal movement. Last ate at 11am this AM with 18U of insulin. Took 13U of NPH this AM and usually take 18U lispro with meals and 30U NPH around 230am.     Had signed federal sterilization papers in clinic but would rather have a Nexplanon placed. Reaffirmed that even in the case of a  she does not want a bilateral salpingectomy.     Also has noted some elevated BP over the last couple of visits, no history of elevated BP in the past.     Pregnancy notable for:  1. Fetal growth restriction   2. DM2  3. Fetal PACs  4. Class 1 obesity  5. History of molar pregnancy   6. AMA          Pregnancy history:     OBSTETRIC HISTORY:    OB History    Para Term  AB Living   2 0 0 0 1 0   SAB IAB Ectopic Multiple Live Births   1 0 0 0 0      # Outcome Date GA Lbr Jeremy/2nd Weight Sex Delivery Anes PTL Lv   2 Current            1 SAB                Prenatal Labs:   Rh positive  HIV/RPR/HepB neg  Rubella immune   GBS positive    Active Problem List  Patient Active Problem List   Diagnosis     Indication for  care in labor and delivery, antepartum     H/O fetal biophysical profile     Poor fetal growth affecting management of mother in third trimester     Type 2 diabetes mellitus, with long-term current use of insulin (H)     Language barrier     Personal history of urinary tract infection     Fetal growth retardation, antepartum     Medication Prior to Admission  Medications Prior to Admission   Medication Sig Dispense Refill Last Dose     acetaminophen (TYLENOL) 325 MG tablet Take 3 tablets (975 mg) by mouth every 6 hours as needed for mild pain 30 tablet 1      aspirin (ASA) 81 MG chewable tablet Take 81 mg by mouth daily        insulin lispro (HUMALOG VIAL) 100 UNIT/ML vial Inject 18 Units Subcutaneous 3 times daily (before meals)        insulin NPH (HUMULIN N/NOVOLIN N VIAL) 100 UNIT/ML vial Inject 13 Units Subcutaneous 2 times daily AM 13 UNITS.  AT BEDTIME IS 32 UNITS        Prenatal Vit-Fe Fumarate-FA (PNV PRENATAL PLUS MULTIVITAMIN) 27-1 MG TABS per tablet Take 1 tablet by mouth daily      .        Maternal Past Medical History:     Past Medical History:   Diagnosis Date     Diabetes (H)      Infertility, female      Miscarriage         Maternal Past Surgical History:     Past Surgical History:   Procedure Laterality Date     DILATION AND CURETTAGE SUCTION WITH ULTRASOUND GUIDANCE N/A 2/14/2020    Procedure: DILATION AND CURETTAGE, UTERUS, USING SUCTION, WITH ULTRASOUND GUIDANCE;  Surgeon: Dunia Aranda MD;  Location: [facility] OR     ORTHOPEDIC SURGERY      right wrist surgery             Family History:   No family history on file.         Social History:   Lives with partner Howard. Denies tobacco, ETOH or drug use during pregnancy.         Physical Exam:   /88   Gen: Well appearing, no apparent distress  Cardio: RRR  Resp: Breathing comfortably on room air  Abdomen: gravid, soft, nontender.  Cervix: external os 1cm, unable to reach internal os/20%/4  Presentation:Cephalic by MFM US at 130pm     Fetal  Heart Rate Tracing: Baseline 140, moderate variability, accels present, no decels, PACs present  Tocometer: 0-1 contractions in 10 minutes    Imaging:  MFM US today: BPP 4/8 (off for tone and movement), YAMILA 11.67, increased S/D in UAR, increased doppler flow in MCA, normal DV    Last growth US 3/8/22 EFW 1939g 4%, AC 16%, FL <1%, HC <1%         Assessment:   Amber Bailon is a 36 year old  at 35w6d admitted for a 6/10 BPP x2 days in a row with elevated UAR dopplers and MCA flow. Pregnancy notable for fetal growth restriction, DM2, fetal PACs, class 1 obesity, history of a molar pregnancy and AMA.         Plan:     Fetal growth restriction with 6/10 NST  - Discussed options of CST vs IOL and risk of if she passes a CST and goes home she could continue to have abnormal monitoring on Friday and be back for additional monitoring. Also discussed risk of stillbirth given abnormal monitoring. An IOL will likely take multiple days and she may need a CS if persistent Cat II tracing or Cat III FHT. Also discussed option of going home if failed IOL and reassuring fetal monitoring per her discussion with Dr. Pandey.   - Plan for PO misoprostol if tolerated followed by pitocin vs hung balloon depending on cervical change   - continuous fetal monitoring  - GBS positive, PCN in active labor  - IV in place, no IVF needed at this time, start PRN   - NICU consult ordered   - Type and screen, RPR, CBC and Covid swab ordered   - expecting a baby boy     DM2  - using 30U NPH at bedtime and 13U NPH in AM and 18U humalog premeals, will do half dose NPH at bedtime and decide on AM dose pending labor course   - taking 81mg ASA  - last growth US 3/8/22 EFW 1939g 4%, AC 16%, FL <1%, HC <1%   - normal baseline preE labs  - regular diet in early labor     Elevated BP on admission  - HELLP labs and UPC ordered     Prenatal care:   - Genetic screening: NIPS neg 10/1/2021 MSAFP: WNL  - New OB labs: Rh pos, HIV/RPR neg, HepB Ag NR,  Rubella immune, Hep C NR  - Anatomy ultrasound: level 2 US WNL  - 2nd trimester labs: ordered   - Tdap given 2/11/2022 , Flu shot given 10/1/2021, Covid vaccines completed  - GBS positive  - continue prenatal vitamins  - Feed: breast, script provided  - BC: sterilization consent signed 2/1/22, but now wants Nexplanon. Declines bilateral salpingectomy even in the setting of a CS.     AMA  - NIPS neg  - MSAFP WNL    History of a molar pregnancy  - send placenta for pathology    Patti Lewis MD

## 2022-03-17 LAB
GLUCOSE BLDC GLUCOMTR-MCNC: 103 MG/DL (ref 70–99)
GLUCOSE BLDC GLUCOMTR-MCNC: 110 MG/DL (ref 70–99)
GLUCOSE BLDC GLUCOMTR-MCNC: 61 MG/DL (ref 70–99)
GLUCOSE BLDC GLUCOMTR-MCNC: 70 MG/DL (ref 70–99)
GLUCOSE BLDC GLUCOMTR-MCNC: 72 MG/DL (ref 70–99)
GLUCOSE BLDC GLUCOMTR-MCNC: 84 MG/DL (ref 70–99)
T PALLIDUM AB SER QL: NONREACTIVE

## 2022-03-17 PROCEDURE — 120N000001 HC R&B MED SURG/OB

## 2022-03-17 PROCEDURE — 250N000013 HC RX MED GY IP 250 OP 250 PS 637: Performed by: STUDENT IN AN ORGANIZED HEALTH CARE EDUCATION/TRAINING PROGRAM

## 2022-03-17 PROCEDURE — 99232 SBSQ HOSP IP/OBS MODERATE 35: CPT | Performed by: NURSE PRACTITIONER

## 2022-03-17 PROCEDURE — 250N000013 HC RX MED GY IP 250 OP 250 PS 637: Performed by: OBSTETRICS & GYNECOLOGY

## 2022-03-17 PROCEDURE — 250N000009 HC RX 250: Performed by: STUDENT IN AN ORGANIZED HEALTH CARE EDUCATION/TRAINING PROGRAM

## 2022-03-17 PROCEDURE — 722N000001 HC LABOR CARE VAGINAL DELIVERY SINGLE

## 2022-03-17 PROCEDURE — 88307 TISSUE EXAM BY PATHOLOGIST: CPT | Mod: TC | Performed by: OBSTETRICS & GYNECOLOGY

## 2022-03-17 PROCEDURE — 258N000003 HC RX IP 258 OP 636: Performed by: STUDENT IN AN ORGANIZED HEALTH CARE EDUCATION/TRAINING PROGRAM

## 2022-03-17 PROCEDURE — 0HQ9XZZ REPAIR PERINEUM SKIN, EXTERNAL APPROACH: ICD-10-PCS | Performed by: OBSTETRICS & GYNECOLOGY

## 2022-03-17 PROCEDURE — 250N000009 HC RX 250: Performed by: OBSTETRICS & GYNECOLOGY

## 2022-03-17 PROCEDURE — 10907ZC DRAINAGE OF AMNIOTIC FLUID, THERAPEUTIC FROM PRODUCTS OF CONCEPTION, VIA NATURAL OR ARTIFICIAL OPENING: ICD-10-PCS | Performed by: OBSTETRICS & GYNECOLOGY

## 2022-03-17 PROCEDURE — 250N000011 HC RX IP 250 OP 636: Performed by: STUDENT IN AN ORGANIZED HEALTH CARE EDUCATION/TRAINING PROGRAM

## 2022-03-17 RX ORDER — DOCUSATE SODIUM 100 MG/1
100 CAPSULE, LIQUID FILLED ORAL DAILY
Status: DISCONTINUED | OUTPATIENT
Start: 2022-03-18 | End: 2022-03-19 | Stop reason: HOSPADM

## 2022-03-17 RX ORDER — NICOTINE POLACRILEX 4 MG
15-30 LOZENGE BUCCAL
Status: DISCONTINUED | OUTPATIENT
Start: 2022-03-17 | End: 2022-03-17 | Stop reason: HOSPADM

## 2022-03-17 RX ORDER — MISOPROSTOL 200 UG/1
400 TABLET ORAL
Status: DISCONTINUED | OUTPATIENT
Start: 2022-03-17 | End: 2022-03-19 | Stop reason: HOSPADM

## 2022-03-17 RX ORDER — MODIFIED LANOLIN
OINTMENT (GRAM) TOPICAL
Status: DISCONTINUED | OUTPATIENT
Start: 2022-03-17 | End: 2022-03-19 | Stop reason: HOSPADM

## 2022-03-17 RX ORDER — NALBUPHINE HYDROCHLORIDE 10 MG/ML
2.5-5 INJECTION, SOLUTION INTRAMUSCULAR; INTRAVENOUS; SUBCUTANEOUS EVERY 6 HOURS PRN
Status: DISCONTINUED | OUTPATIENT
Start: 2022-03-17 | End: 2022-03-17

## 2022-03-17 RX ORDER — OXYTOCIN 10 [USP'U]/ML
10 INJECTION, SOLUTION INTRAMUSCULAR; INTRAVENOUS
Status: DISCONTINUED | OUTPATIENT
Start: 2022-03-17 | End: 2022-03-19 | Stop reason: HOSPADM

## 2022-03-17 RX ORDER — ONDANSETRON 2 MG/ML
4 INJECTION INTRAMUSCULAR; INTRAVENOUS EVERY 6 HOURS PRN
Status: DISCONTINUED | OUTPATIENT
Start: 2022-03-17 | End: 2022-03-17

## 2022-03-17 RX ORDER — IBUPROFEN 800 MG/1
800 TABLET, FILM COATED ORAL EVERY 6 HOURS PRN
Status: DISCONTINUED | OUTPATIENT
Start: 2022-03-17 | End: 2022-03-19 | Stop reason: HOSPADM

## 2022-03-17 RX ORDER — BUPIVACAINE HYDROCHLORIDE 2.5 MG/ML
10 INJECTION, SOLUTION EPIDURAL; INFILTRATION; INTRACAUDAL ONCE
Status: DISCONTINUED | OUTPATIENT
Start: 2022-03-17 | End: 2022-03-17

## 2022-03-17 RX ORDER — TRANEXAMIC ACID 10 MG/ML
1 INJECTION, SOLUTION INTRAVENOUS EVERY 30 MIN PRN
Status: DISCONTINUED | OUTPATIENT
Start: 2022-03-17 | End: 2022-03-19 | Stop reason: HOSPADM

## 2022-03-17 RX ORDER — FENTANYL CITRATE-0.9 % NACL/PF 10 MCG/ML
100 PLASTIC BAG, INJECTION (ML) INTRAVENOUS EVERY 5 MIN PRN
Status: DISCONTINUED | OUTPATIENT
Start: 2022-03-17 | End: 2022-03-17

## 2022-03-17 RX ORDER — ONDANSETRON 4 MG/1
4 TABLET, ORALLY DISINTEGRATING ORAL EVERY 6 HOURS PRN
Status: DISCONTINUED | OUTPATIENT
Start: 2022-03-17 | End: 2022-03-17

## 2022-03-17 RX ORDER — CARBOPROST TROMETHAMINE 250 UG/ML
250 INJECTION, SOLUTION INTRAMUSCULAR
Status: DISCONTINUED | OUTPATIENT
Start: 2022-03-17 | End: 2022-03-19 | Stop reason: HOSPADM

## 2022-03-17 RX ORDER — SCOLOPAMINE TRANSDERMAL SYSTEM 1 MG/1
1 PATCH, EXTENDED RELEASE TRANSDERMAL
Status: DISCONTINUED | OUTPATIENT
Start: 2022-03-17 | End: 2022-03-17 | Stop reason: HOSPADM

## 2022-03-17 RX ORDER — SODIUM CHLORIDE 9 MG/ML
INJECTION, SOLUTION INTRAVENOUS CONTINUOUS
Status: DISCONTINUED | OUTPATIENT
Start: 2022-03-17 | End: 2022-03-17 | Stop reason: HOSPADM

## 2022-03-17 RX ORDER — ACETAMINOPHEN 325 MG/1
650 TABLET ORAL EVERY 4 HOURS PRN
Status: DISCONTINUED | OUTPATIENT
Start: 2022-03-17 | End: 2022-03-19 | Stop reason: HOSPADM

## 2022-03-17 RX ORDER — DEXTROSE MONOHYDRATE 25 G/50ML
25-50 INJECTION, SOLUTION INTRAVENOUS
Status: DISCONTINUED | OUTPATIENT
Start: 2022-03-17 | End: 2022-03-19 | Stop reason: HOSPADM

## 2022-03-17 RX ORDER — OXYTOCIN/0.9 % SODIUM CHLORIDE 30/500 ML
1-24 PLASTIC BAG, INJECTION (ML) INTRAVENOUS CONTINUOUS
Status: DISCONTINUED | OUTPATIENT
Start: 2022-03-17 | End: 2022-03-17 | Stop reason: HOSPADM

## 2022-03-17 RX ORDER — FENTANYL CITRATE-0.9 % NACL/PF 10 MCG/ML
100 PLASTIC BAG, INJECTION (ML) INTRAVENOUS EVERY 5 MIN PRN
Status: DISCONTINUED | OUTPATIENT
Start: 2022-03-17 | End: 2022-03-17 | Stop reason: HOSPADM

## 2022-03-17 RX ORDER — METHYLERGONOVINE MALEATE 0.2 MG/ML
200 INJECTION INTRAVENOUS
Status: DISCONTINUED | OUTPATIENT
Start: 2022-03-17 | End: 2022-03-19 | Stop reason: HOSPADM

## 2022-03-17 RX ORDER — BISACODYL 10 MG
10 SUPPOSITORY, RECTAL RECTAL DAILY PRN
Status: DISCONTINUED | OUTPATIENT
Start: 2022-03-17 | End: 2022-03-19 | Stop reason: HOSPADM

## 2022-03-17 RX ORDER — MISOPROSTOL 200 UG/1
800 TABLET ORAL
Status: DISCONTINUED | OUTPATIENT
Start: 2022-03-17 | End: 2022-03-19 | Stop reason: HOSPADM

## 2022-03-17 RX ORDER — SCOLOPAMINE TRANSDERMAL SYSTEM 1 MG/1
1 PATCH, EXTENDED RELEASE TRANSDERMAL
Status: DISCONTINUED | OUTPATIENT
Start: 2022-03-17 | End: 2022-03-17

## 2022-03-17 RX ORDER — DEXTROSE MONOHYDRATE 25 G/50ML
25-50 INJECTION, SOLUTION INTRAVENOUS
Status: DISCONTINUED | OUTPATIENT
Start: 2022-03-17 | End: 2022-03-17 | Stop reason: HOSPADM

## 2022-03-17 RX ORDER — NICOTINE POLACRILEX 4 MG
15-30 LOZENGE BUCCAL
Status: DISCONTINUED | OUTPATIENT
Start: 2022-03-17 | End: 2022-03-19 | Stop reason: HOSPADM

## 2022-03-17 RX ORDER — SODIUM CHLORIDE, SODIUM LACTATE, POTASSIUM CHLORIDE, CALCIUM CHLORIDE 600; 310; 30; 20 MG/100ML; MG/100ML; MG/100ML; MG/100ML
INJECTION, SOLUTION INTRAVENOUS CONTINUOUS PRN
Status: DISCONTINUED | OUTPATIENT
Start: 2022-03-17 | End: 2022-03-17 | Stop reason: HOSPADM

## 2022-03-17 RX ORDER — OXYTOCIN/0.9 % SODIUM CHLORIDE 30/500 ML
340 PLASTIC BAG, INJECTION (ML) INTRAVENOUS CONTINUOUS PRN
Status: DISCONTINUED | OUTPATIENT
Start: 2022-03-17 | End: 2022-03-19 | Stop reason: HOSPADM

## 2022-03-17 RX ORDER — BUPIVACAINE HYDROCHLORIDE 2.5 MG/ML
10 INJECTION, SOLUTION EPIDURAL; INFILTRATION; INTRACAUDAL ONCE
Status: DISCONTINUED | OUTPATIENT
Start: 2022-03-17 | End: 2022-03-17 | Stop reason: HOSPADM

## 2022-03-17 RX ORDER — HYDROCORTISONE 2.5 %
CREAM (GRAM) TOPICAL 3 TIMES DAILY PRN
Status: DISCONTINUED | OUTPATIENT
Start: 2022-03-17 | End: 2022-03-19 | Stop reason: HOSPADM

## 2022-03-17 RX ADMIN — MISOPROSTOL 25 MCG: 100 TABLET ORAL at 12:03

## 2022-03-17 RX ADMIN — SODIUM CHLORIDE, POTASSIUM CHLORIDE, SODIUM LACTATE AND CALCIUM CHLORIDE: 600; 310; 30; 20 INJECTION, SOLUTION INTRAVENOUS at 14:01

## 2022-03-17 RX ADMIN — MISOPROSTOL 25 MCG: 100 TABLET ORAL at 06:04

## 2022-03-17 RX ADMIN — DEXTROSE MONOHYDRATE 3 MILLION UNITS: 5 INJECTION, SOLUTION INTRAVENOUS at 17:58

## 2022-03-17 RX ADMIN — MISOPROSTOL 25 MCG: 100 TABLET ORAL at 08:09

## 2022-03-17 RX ADMIN — PENICILLIN G POTASSIUM 5 MILLION UNITS: 5000000 POWDER, FOR SOLUTION INTRAMUSCULAR; INTRAPLEURAL; INTRATHECAL; INTRAVENOUS at 14:06

## 2022-03-17 RX ADMIN — INSULIN ASPART 18 UNITS: 100 INJECTION, SOLUTION INTRAVENOUS; SUBCUTANEOUS at 08:32

## 2022-03-17 RX ADMIN — INSULIN HUMAN 13 UNITS: 100 INJECTION, SUSPENSION SUBCUTANEOUS at 08:30

## 2022-03-17 RX ADMIN — FENTANYL CITRATE 100 MCG: 50 INJECTION, SOLUTION INTRAMUSCULAR; INTRAVENOUS at 17:44

## 2022-03-17 RX ADMIN — Medication 2 MILLI-UNITS/MIN: at 14:02

## 2022-03-17 RX ADMIN — INSULIN ASPART 18 UNITS: 100 INJECTION, SOLUTION INTRAVENOUS; SUBCUTANEOUS at 14:29

## 2022-03-17 RX ADMIN — MISOPROSTOL 25 MCG: 100 TABLET ORAL at 00:03

## 2022-03-17 RX ADMIN — IBUPROFEN 800 MG: 800 TABLET, FILM COATED ORAL at 23:03

## 2022-03-17 RX ADMIN — Medication 340 ML/HR: at 21:50

## 2022-03-17 RX ADMIN — MISOPROSTOL 25 MCG: 100 TABLET ORAL at 02:05

## 2022-03-17 RX ADMIN — MISOPROSTOL 25 MCG: 100 TABLET ORAL at 10:02

## 2022-03-17 RX ADMIN — MISOPROSTOL 25 MCG: 100 TABLET ORAL at 04:03

## 2022-03-17 ASSESSMENT — ACTIVITIES OF DAILY LIVING (ADL)
ADLS_ACUITY_SCORE: 3

## 2022-03-17 NOTE — PROVIDER NOTIFICATION
03/17/22 1334   Provider Notification   Provider Name/Title Dr. Stallworth   Method of Notification At Bedside   Dr. Stallworth at bedside. Updated that pt has received 10 doses of PO Cytotec. Pt has been sleeping on and off the last few hours. Irregular ctx. FHT's cat I tracing with audible irregular heart rate. SVE by MD 2.5/75/-1, posterior and soft. Lee score 9.    Orders to start IV Pitocin and IV PCN at 1400. Pt updated and agreeable on POC. All questions answered.

## 2022-03-17 NOTE — PROGRESS NOTES
LABOR NOTE    Subjective: Reports cramping, overall comfortable. Baby active. Membranes intact. Normotensive.    Objective:  /59   Pulse 110   Temp 99  F (37.2  C) (Oral)   Resp 15    FHT: category 1  [place]: q 2-5 min  SVE: 2-3/70/-1        Assessment:   Amber Bailon is a 36 year old  at 35w6d admitted for a 6/10 BPP x2 days in a row with elevated UAR dopplers and MCA flow. Pregnancy notable for fetal growth restriction, DM2, fetal PACs, class 1 obesity, history of a molar pregnancy and AMA.          Plan:      Fetal growth restriction with 6/10 NST  - Has discussed option of going home if failed IOL and reassuring fetal monitoring per her discussion with Dr. Pandey.   - Plan for PO misoprostol if tolerated followed by pitocin vs hung balloon depending on cervical change   - continuous fetal monitoring  - GBS positive, PCN in active labor  - IV in place, no IVF needed at this time, start PRN   - NICU consult ordered   - Type and screen, RPR, CBC and Covid swab ordered   - expecting a baby boy      Preeclampsia without severe features  -BP mild range, prot:Cr 0.4  -HELLP labs normal    IOL  -s/p oral cytotec x10  -pitocin begun 1400  -PCN begun 1400     DM2  - using 30U NPH at bedtime and 13U NPH in AM and 18U humalog premeals, will do half dose NPH at bedtime and decide on AM dose pending labor course   - taking 81mg ASA  - last growth US 3/8/22 EFW 1939g 4%, AC 16%, FL <1%, HC <1%   - normal baseline preE labs  - regular diet in early labor      Prenatal care:   - Genetic screening: NIPS neg 10/1/2021 MSAFP: WNL  - New OB labs: Rh pos, HIV/RPR neg, HepB Ag NR, Rubella immune, Hep C NR  - Anatomy ultrasound: level 2 US WNL  - 2nd trimester labs: ordered   - Tdap given 2022 , Flu shot given 10/1/2021, Covid vaccines completed  - GBS positive  - continue prenatal vitamins  - Feed: breast, script provided  - BC: sterilization consent signed 22, but now wants Nexplanon. Declines bilateral  salpingectomy even in the setting of a CS.     AMA  - NIPS neg  - MSAFP WNL     History of a molar pregnancy  - send placenta for pathology     Tamiko Stallworth MD

## 2022-03-17 NOTE — PROVIDER NOTIFICATION
03/17/22 1640   Provider Notification   Provider Name/Title Dr. Stallworth   Method of Notification At Bedside   Dr. Stallworth at bedside. MD reviewed strip (cat I) with ctx 2-4 min on 6 of Pit. NNP consult completed. Will be treated for GBS at 1806. ? AROM at that time. No new orders at this time.

## 2022-03-17 NOTE — PROVIDER NOTIFICATION
22 1630   Provider Notification   Provider Name/Title Dr. Lewis   Method of Notification At Bedside   Request Evaluate in Person   Notification Reason Patient Arrived     Data: Patient admitted to room 411 at 1530. Patient is a . Prenatal record reviewed.   OB History    Para Term  AB Living   2 0 0 0 1 0   SAB IAB Ectopic Multiple Live Births   1 0 0 0 0      # Outcome Date GA Lbr Jeremy/2nd Weight Sex Delivery Anes PTL Lv   2 Current            1 SAB            .  Medical History:   Past Medical History:   Diagnosis Date     Diabetes (H)      Infertility, female      Miscarriage    .  Gestational age 35w6d. Vital signs per doc flowsheet. Fetal movement present. Patient reports No chief complaint on file.   as reason for admission. Support persons Howard present.  Action:Verbal consent for EFM, external fetal monitors applied. Admission assessment completed. Patient and support persons educated on labor process. Patient instructed to report change in fetal movement, contractions, vaginal leaking of fluid or bleeding, abdominal pain, or any concerns related to the pregnancy to her nurse/physician. Patient oriented to room, call light in reach.   Response: Dr. Lewis is at the bedside to discuss with pt and  different options at this time to proceed with possible induction   The plan at this time will be since Lee is a score of 2 is to start some oral cytotec and continue to monitor FHT's and contractions.  Will monitor pt's bloodsugars and insulin.. Patient verbalized understanding of education and verbalized agreement with plan.

## 2022-03-17 NOTE — PROVIDER NOTIFICATION
03/17/22 0835   Provider Notification   Provider Name/Title Dr. Stallworth   Method of Notification At Bedside   Dr. Stallworth at bedside to see pt and discuss POC--all questions answered. Plan to continue PO Cytotec. SVE prn. Plan to start PCN when active or Pit. No new orders at this time.

## 2022-03-17 NOTE — PROGRESS NOTES
LABOR NOTE    Subjective: Reports increased frequency of contractions. Rating pain at 3/10. Denies needs. Pitocin 6 mU/min.    Objective:  /81   Pulse 110   Temp 98.7  F (37.1  C) (Oral)   Resp 18    FHT: category 1  [place]: q 2-4 min  SVE: not repeated, 2-3/70/-1 at 1415        Assessment:   Amber Bailon is a 36 year old  at 35w6d admitted for a 6/10 BPP x2 days in a row with elevated UAR dopplers and MCA flow. Pregnancy notable for fetal growth restriction, DM2, fetal PACs, class 1 obesity, history of a molar pregnancy and AMA.          Plan:      Fetal growth restriction with 6/10 NST  - Has discussed option of going home if failed IOL and reassuring fetal monitoring per her discussion with Dr. Pandey.   - Plan for PO misoprostol if tolerated followed by pitocin vs hung balloon depending on cervical change   - continuous fetal monitoring  - GBS positive, PCN in active labor  - IV in place, no IVF needed at this time, start PRN   - NICU consult ordered   - Type and screen, RPR, CBC and Covid swab ordered   - expecting a baby boy      Preeclampsia without severe features  -BP mild range, prot:Cr 0.4  -HELLP labs normal     IOL  -s/p oral cytotec x10  -pitocin begun 1400  -PCN begun 1400     DM2  - using 30U NPH at bedtime and 13U NPH in AM and 18U humalog premeals, will do half dose NPH at bedtime and decide on AM dose pending labor course   - taking 81mg ASA  - last growth US 3/8/22 EFW 1939g 4%, AC 16%, FL <1%, HC <1%   - normal baseline preE labs  - regular diet in early labor      Prenatal care:   - Genetic screening: NIPS neg 10/1/2021 MSAFP: WNL  - New OB labs: Rh pos, HIV/RPR neg, HepB Ag NR, Rubella immune, Hep C NR  - Anatomy ultrasound: level 2 US WNL  - 2nd trimester labs: ordered   - Tdap given 2022 , Flu shot given 10/1/2021, Covid vaccines completed  - GBS positive  - continue prenatal vitamins  - Feed: breast, script provided  - BC: sterilization consent signed 22, but  now wants Nexplanon. Declines bilateral salpingectomy even in the setting of a CS.     AMA  - NIPS neg  - MSAFP WNL     History of a molar pregnancy  - send placenta for pathology     MD Tamiko Aguayo MD

## 2022-03-17 NOTE — PROVIDER NOTIFICATION
03/17/22 1741   Provider Notification   Provider Name/Title Dr. Stallworth   Method of Notification At Bedside   Dr. Stallworth at bedside. Pt expressing stronger contractions. SVE by MD 5/80/0. IV Fentanyl given to pt per request. AROM by MD for clear/bloody amniotic fluid. IUPC and FSE placed. SVE now 6/80/0. Will start hourly blood glucose monitoring. MD states she will be in hospital when/if needed. No new orders at this time.

## 2022-03-17 NOTE — PROGRESS NOTES
LABOR NOTE    Subjective: Reports stronger contractions. Pitocin remains at 6. Early decelerations noted occasionally, and significant cervical change on SVE. AROM with [place] (clear/blood tinged), and internal monitors placed.    Objective:  BP (!) 141/79   Pulse 110   Temp 99.4  F (37.4  C) (Oral)   Resp 18    FHT: category 1  [place]: q 2-3 min  SVE: 6/90/0        Assessment:   Amber Bailon is a 36 year old  at 35w6d admitted for a 6/10 BPP x2 days in a row with elevated UAR dopplers and MCA flow. Pregnancy notable for fetal growth restriction, DM2, fetal PACs, class 1 obesity, history of a molar pregnancy and AMA.          Plan:      Fetal growth restriction with 6/10 NST  - Has discussed option of going home if failed IOL and reassuring fetal monitoring per her discussion with Dr. Pandey.   - Plan for PO misoprostol if tolerated followed by pitocin vs hung balloon depending on cervical change   - continuous fetal monitoring  - GBS positive, PCN in active labor  - IV in place, no IVF needed at this time, start PRN   - NICU consult ordered   - Type and screen, RPR, CBC and Covid swab ordered   - expecting a baby boy      Preeclampsia without severe features  -BP mild range, prot:Cr 0.4  -HELLP labs normal     IOL  -s/p oral cytotec x10  -pitocin begun 1400  -PCN begun 1400  -AROM(blood tinged), internals 1800     DM2  - using 30U NPH at bedtime and 13U NPH in AM and 18U humalog premeals, will do half dose NPH at bedtime and decide on AM dose pending labor course   - taking 81mg ASA  - last growth US 3/8/22 EFW 1939g 4%, AC 16%, FL <1%, HC <1%   - normal baseline preE labs  - regular diet in early labor      Prenatal care:   - Genetic screening: NIPS neg 10/1/2021 MSAFP: WNL  - New OB labs: Rh pos, HIV/RPR neg, HepB Ag NR, Rubella immune, Hep C NR  - Anatomy ultrasound: level 2 US WNL  - 2nd trimester labs: ordered   - Tdap given 2022 , Flu shot given 10/1/2021, Covid vaccines completed  -  GBS positive  - continue prenatal vitamins  - Feed: breast, script provided  - BC: sterilization consent signed 2/1/22, but now wants Nexplanon. Declines bilateral salpingectomy even in the setting of a CS.     AMA  - NIPS neg  - MSAFP WNL     History of a molar pregnancy  - send placenta for pathology     Tamiko Stallworth MD

## 2022-03-17 NOTE — PROGRESS NOTES
LABOR NOTE    Subjective: Reports feeling mild cramping. Baby active. Reviewed findings of BPPs, IUGR, preeclampsia. Discussed plan of care of oral cytotec x12 (currently at 8), with SVE prn, reassessment after 12 doses, with plan thereafter ([place], cervidil, pitocin, etc).    Objective:  /75   Pulse 110   Temp 98.9  F (37.2  C) (Oral)   Resp 16    FHT: category 1  [place]: q 4-7 min  SVE: not repeated        Assessment:   Amber Bailon is a 36 year old  at 35w6d admitted for a 6/10 BPP x2 days in a row with elevated UAR dopplers and MCA flow. Pregnancy notable for fetal growth restriction, DM2, fetal PACs, class 1 obesity, history of a molar pregnancy and AMA.          Plan:      Fetal growth restriction with /10 NST  - Has discussed option of going home if failed IOL and reassuring fetal monitoring per her discussion with Dr. Pandey.   - Plan for PO misoprostol if tolerated followed by pitocin vs hung balloon depending on cervical change   - continuous fetal monitoring  - GBS positive, PCN in active labor  - IV in place, no IVF needed at this time, start PRN   - NICU consult ordered   - Type and screen, RPR, CBC and Covid swab ordered   - expecting a baby boy     Preeclampsia without severe features  -BP mild range, prot:Cr 0.4  -HELLP labs normal     DM2  - using 30U NPH at bedtime and 13U NPH in AM and 18U humalog premeals, will do half dose NPH at bedtime and decide on AM dose pending labor course   - taking 81mg ASA  - last growth US 3/8/22 EFW 1939g 4%, AC 16%, FL <1%, HC <1%   - normal baseline preE labs  - regular diet in early labor      Prenatal care:   - Genetic screening: NIPS neg 10/1/2021 MSAFP: WNL  - New OB labs: Rh pos, HIV/RPR neg, HepB Ag NR, Rubella immune, Hep C NR  - Anatomy ultrasound: level 2 US WNL  - 2nd trimester labs: ordered   - Tdap given 2022 , Flu shot given 10/1/2021, Covid vaccines completed  - GBS positive  - continue prenatal vitamins  - Feed: breast,  script provided  - BC: sterilization consent signed 2/1/22, but now wants Nexplanon. Declines bilateral salpingectomy even in the setting of a CS.     AMA  - NIPS neg  - MSAFP WNL     History of a molar pregnancy  - send placenta for pathology    Tamiko Stallworth MD

## 2022-03-17 NOTE — CONSULTS
[facility]                   Neonatology Advanced Practice Antepartum Counseling Consult      I was asked to provide antepartum counseling for Amber Bailon at the request of Tamiko Stallworth MD secondary to late  labor and IUGR. Ms. Bailon is currently 36 weeks and has a hx significant for Pre-eclampsia, diabetes and GBS +. Betamethasone was not administered. Ms. Bailon, accompanied by her , was counseled on the expected hospital course, potential risks, and outcomes associated with an infant born with IUGR at approximately 36 weeks gestation. The counseling included: morbidity, mortality, initial delivery room stabilization, respiratory course, lung development, RDS,  hemodynamic support, infection, nutrition, growth and development, and long term outcomes. Please feel free to call with any additional questions or concerns.          MAURILIO Drake CNP   Advanced Practice Service    Intensive Care Unit  [facility]      Floor Time (min): 10  Face to Face Time (min): 20  Total Time (minutes): 30  More than 50% of my time was spent in direct, face to face, antepartum counseling with the above patient.      BRIGITTE Drake 3/17/2022 4:33 PM

## 2022-03-17 NOTE — PROVIDER NOTIFICATION
03/16/22 4246   Provider Notification   Provider Name/Title Dr. Lewis   Method of Notification Phone   Request Evaluate - Remote   Notification Reason Lab/Diagnostic Study   MD notified that Protien random total urine was elevated at 0.43 .  Bp's are WNL, Fhts are category 1 and roger 2-8-9 minutes apart after 3 doses of cytotec pt is comfortable.  No new orders received at this time.

## 2022-03-18 PROBLEM — Z87.440 PERSONAL HISTORY OF URINARY TRACT INFECTION: Status: RESOLVED | Noted: 2022-03-15 | Resolved: 2022-03-18

## 2022-03-18 LAB
GLUCOSE BLDC GLUCOMTR-MCNC: 117 MG/DL (ref 70–99)
GLUCOSE BLDC GLUCOMTR-MCNC: 135 MG/DL (ref 70–99)
GLUCOSE BLDC GLUCOMTR-MCNC: 67 MG/DL (ref 70–99)
HBA1C MFR BLD: 5.8 % (ref 0–5.6)
HGB BLD-MCNC: 12.1 G/DL (ref 11.7–15.7)

## 2022-03-18 PROCEDURE — 120N000001 HC R&B MED SURG/OB

## 2022-03-18 PROCEDURE — 83036 HEMOGLOBIN GLYCOSYLATED A1C: CPT | Performed by: INTERNAL MEDICINE

## 2022-03-18 PROCEDURE — 85018 HEMOGLOBIN: CPT | Performed by: OBSTETRICS & GYNECOLOGY

## 2022-03-18 PROCEDURE — 99231 SBSQ HOSP IP/OBS SF/LOW 25: CPT | Performed by: INTERNAL MEDICINE

## 2022-03-18 PROCEDURE — 36415 COLL VENOUS BLD VENIPUNCTURE: CPT | Performed by: OBSTETRICS & GYNECOLOGY

## 2022-03-18 PROCEDURE — 250N000013 HC RX MED GY IP 250 OP 250 PS 637: Performed by: OBSTETRICS & GYNECOLOGY

## 2022-03-18 RX ORDER — DOCUSATE SODIUM 100 MG/1
100 CAPSULE, LIQUID FILLED ORAL 2 TIMES DAILY PRN
Qty: 60 CAPSULE | Refills: 0 | Status: SHIPPED | OUTPATIENT
Start: 2022-03-18

## 2022-03-18 RX ORDER — ACETAMINOPHEN 325 MG/1
650 TABLET ORAL EVERY 6 HOURS PRN
Qty: 60 TABLET | Refills: 0 | Status: SHIPPED | OUTPATIENT
Start: 2022-03-18

## 2022-03-18 RX ORDER — IBUPROFEN 800 MG/1
800 TABLET, FILM COATED ORAL EVERY 6 HOURS PRN
Qty: 60 TABLET | Refills: 0 | Status: SHIPPED | OUTPATIENT
Start: 2022-03-18

## 2022-03-18 RX ADMIN — IBUPROFEN 800 MG: 800 TABLET, FILM COATED ORAL at 07:45

## 2022-03-18 RX ADMIN — IBUPROFEN 800 MG: 800 TABLET, FILM COATED ORAL at 22:26

## 2022-03-18 RX ADMIN — DOCUSATE SODIUM 100 MG: 100 CAPSULE, LIQUID FILLED ORAL at 07:45

## 2022-03-18 ASSESSMENT — ACTIVITIES OF DAILY LIVING (ADL)
ADLS_ACUITY_SCORE: 3

## 2022-03-18 NOTE — PLAN OF CARE
VSS and pt ad anastasia. Fundus firm, lochia scant. Pitocin discontinued. Breast feeding baby, encouraged to hand express and supplement baby with spoon feedings, nipple shield utilized to help with baby's latch. Plans to breast and bottle feed. Type II diabetic, blood glucose checks order before meals (see order). Pain management with tylenol and ibuprofen. Bonding well with baby, SO at bedside and supportive. Continue with plan of care.

## 2022-03-18 NOTE — LACTATION NOTE
"This note was copied from a baby's chart.  Lactation visit. This is Amber's first baby (Eder). Eder is a late  infant born at 36w0d. Amber reports breastfeeding is \"so far so good.\" Eder is breastfeeding with a nipple shield and being supplemented with formula. Amber asked if Eder is getting enough at the breast. Writer discussed differences between colostrum and mature milk. Writer helped Amber hand express and colostrum was flowing after one breast compression. Typical  feeding behavior of the first few days was reviewed. Plan for follow up lactation support as needed.  "

## 2022-03-18 NOTE — L&D DELIVERY NOTE
Amber Bailon is a 36 year old  who was admitted due to 6/10 BPP. This was repeated in 24 hours, and found to be unchanged. Additionally, there were abnormal MCA dopplers, and severe IUGR. The pregnancy was additionally complicated by A2 GDM, AMA, maternal obesity, GBS positive.. Decision was made to move forward with induction, and she was found to be closed.  Rh positive, Covid negative. She was admitted to Labor and Delivery. She received cytotec x10, followed by pitocin, and AROM. Labor progressed, and nitrous and Fentanyl were administered. Internal monitors were placed, and  contractions were adequate. Rupture of membranes was artificial, and clear fluid was noted. She progressed to complete. She pushed effectively, for approximately 48 minutes. At 2143, she experienced  of a vigorous male , from an MACY position, over a first degree laceration. Repair was not needed.  APGARS 9/9. Pitocin was begun after delivery of the baby. The cord was cut at 120+ seconds, as it had ceased pulsing. A three vessel cord was noted. The placenta delivered spontaneously, and found to appear intact on inspection. QBL 50mL.    Tamiko Stallworth MD on 3/17/2022 at 10:11 PM

## 2022-03-18 NOTE — PLAN OF CARE
Data: Amber Bailon transferred to 431 via wheelchair at 0035. Baby transferred via parent's arms.  Action: Receiving unit notified of transfer: Yes. Patient and family notified of room change. Report given to Berta at 0035. Belongings sent to receiving unit. Accompanied by Registered Nurse. Oriented patient to surroundings. Call light within reach. ID bands double-checked with receiving RN.  Response: Patient tolerated transfer and is stable.

## 2022-03-18 NOTE — PLAN OF CARE
Pt up and anastasia. Voiding without difficulty, burning because of laceration encouraged patient to use evelina bottle to help eliminate discomfort. Bonding well with baby, responding to infant cues. Breastfeeding with assistance, nipple shield being utilized. Discussed hand expression. Fundus firm and midline. Ibuprofen effective for pain management. Iv discontinued, infiltrated. Discussed skin-skin, benefits of breastfeeding, and safe sleeping. Education completed. Continue to monitor.    Cassie Cornelius RN

## 2022-03-18 NOTE — PLAN OF CARE
Dr. Stallworth notified of might-moderate bleeding with fundal checks, no clots. Verbal order to infuse another bag of pitocin at 100ml/hr

## 2022-03-18 NOTE — PROVIDER NOTIFICATION
03/17/22 2011   Provider Notification   Provider Name/Title Dr Stallworth    Method of Notification At Bedside   MD at bedside to perform SVE. Patient feels urge to push. Anterior lip

## 2022-03-18 NOTE — PROVIDER NOTIFICATION
03/17/22 1955   Provider Notification   Provider Name/Title Dr. Stallworth   Method of Notification In Department   Notification Reason SVE   MD in department, notified that patient is 8/100/0 and feeling rectal pressure with contractions. Starting nitrous for pain. MD reviewed strip.

## 2022-03-18 NOTE — PROGRESS NOTES
[facility]  PPD# 1    Pt doing well. Ambulating, voiding, tolerating PO. Decreased lochia. Pain controlled. Breast and bottle feeding and coping well with infant.     Vitals:    22 0001 22 0043 22 0444 22 0745   BP: 130/68 129/73 115/68 122/74   BP Location:   Right arm    Pulse:  79 80 89   Resp:     Temp:  99.1  F (37.3  C) 98.3  F (36.8  C) 99.1  F (37.3  C)   TempSrc:  Oral Oral Oral     Abd soft, appropriately tender, nondistended, FFBU, no fundal tenderness  Ext 1+ edema bilat LE, no CT BL    Lab Results   Component Value Date    HGB 12.1 2022     Results for CHERI GODWIN D (MRN [id number]) as of 3/18/2022 13:31   Ref. Range 3/17/2022 20:06 3/17/2022 22:14 3/18/2022 07:00 3/18/2022 07:01 3/18/2022 11:34   GLUCOSE BY METER POCT Latest Ref Range: 70 - 99 mg/dL 70   67 (L) 117 (H)       A/P 36 year old  at 36w0d s/p  PPD# 1. Pregancy complicated by T2DM, AMA, Class 2 obesity, GBS pos carrier, language barrier, IUGR, Fetal PACs, Hx of molar pregnancy.       -Hemodynamically stable   -Rh pos  -Diet; regular  -pain Tylenol and Motrin  -Bowel ppx- Senna/docusate/simethicone  -Rubella immune  -Tdap given prenatally  -Breast feeding, encouraged. Continue PNV's, proper hydration and caloric intake couseled  -BC: Nexplanon    T2DM  - hospitalist consulted - appreciate management    Language barrier  - no need for , provider and pt [place]uneian speaking    -Plan; continue routine post-partum care. Likely discharge home tomorrow.       Dr. Casey Stallworth  [phone]  3/18/2022 8:16 AM

## 2022-03-18 NOTE — PROVIDER NOTIFICATION
03/17/22 2045   Provider Notification   Provider Name/Title Dr Stallworth   Method of Notification At Bedside     MD called to bedside, patient would like to start pushing. MD able to reduce anterior cervical lip.

## 2022-03-18 NOTE — PLAN OF CARE
VSS. Fundus and lochia WDL, reported a small clot this evening. Ambulating independently. Voiding without difficulty. Pain adequately controlled. Bonding well with infant. Patient forgot to call for glucose check prior to dinner, instructed to make sure that she calls before bedtime. Family at bedside and supportive.

## 2022-03-18 NOTE — PROVIDER NOTIFICATION
03/17/22 1840   Provider Notification   Provider Name/Title Dr. Stallworth   Method of Notification In Department   Dr. Stallworth aware of intermittent VD's and ED's now with minimal variability and no accelerations after AROM. Will continue to monitor. Updated MD on stable blood glucoses. No new orders at this time.

## 2022-03-18 NOTE — CONSULTS
Consult Date: 03/18/2022    REASON FOR CONSULTATION:  Diabetes mellitus.    HISTORY OF PRESENT ILLNESS:  Ms. Bailon is a 36-year-old female who was admitted on 03/16/2022 after an ultrasound to monitor pregnancy showed a BPP of 6/10.  The patient was admitted for close monitoring.  At the time, she was 35 weeks 6 days pregnant.    Ultimately, a decision was made to induce the pregnancy.  The patient had a spontaneous vaginal delivery at around 10:00 p.m. yesterday evening.    Hospitalist service is being consulted for diabetic management.  In discussion with her diabetic history, the patient reports she was diagnosed with diabetes approximately 4 years ago.  Up until her pregnancy, diabetes was managed with oral metformin.  When she became pregnant, her regimen was changed to insulin.  At the time of her delivery, she was on insulin NPH 30 units in the evening and 13 units in the morning along with Humalog 18 units with meals.  She reports good blood sugar control on this regimen.  She was seeing an endocrinologist during her pregnancy to help manage her diabetes.    Since delivery, her blood sugars have been in the normal to near normal range.  She has had some mild hypoglycemia.  On chart review, her last dose of NPH insulin was yesterday morning, now over 30 hours ago, at a dose of 13 units.  Her last dose of Humalog insulin 18 units was approximately 24 hours ago.    She has not received any further insulin since that time.    Her blood sugars for the past 24 hours have ranged in the  range.    The patient has been in touch with her endocrinologist since delivery.  She reports that her endocrinologist would like her to remain off of metformin and on insulin if needed while she is breastfeeding.    The patient has no other significant past medical history.    PAST MEDICAL HISTORY:  Diabetes mellitus.  See details above.    CURRENT INPATIENT MEDICATIONS:  Docusate.    PERTINENT OUTPATIENT MEDICATIONS:  1.   Insulin NPH 13 units in the morning and 30 units in the evening.  2.  Humalog insulin with meals, 18 units 3 times a day.    ALLERGIES:  NONE REPORTED.    FAMILY HISTORY:  Reviewed.  Nothing contributory to this admission.    SOCIAL HISTORY:  The patient does not smoke or drink alcohol during pregnancy.    REVIEW OF SYSTEMS:  See HPI for details.  Comprehensive greater than 10-point review of systems is otherwise negative besides that detailed above.    PHYSICAL EXAMINATION:    VITAL SIGNS:  Blood pressure is currently 122/74 with a heart rate of 89.  No fever.  Saturations are not recorded.  GENERAL:  The patient appears nontoxic.  She is in no distress.  She is awake, alert and oriented.  She had several family members in the room with her when I visited with her.  HEENT:  Head is atraumatic.  Sclerae white.  Eyelids normal.  Conjunctivae normal.  Extraocular movements are intact.  NECK:  Supple.  No cervical or supraclavicular lymphadenopathy.  CARDIOVASCULAR:  Regular rate and rhythm.  No significant murmurs.  No lower extremity edema.  LUNGS:  Clear to auscultation bilaterally.  No intercostal retractions.  No conversational dyspnea.  ABDOMEN:  Nontender, soft, no masses, no organomegaly.  EXTREMITIES:  Show minimal edema.  SKIN:  Reveals no rashes.  No jaundice.  Skin is dry to touch.  NEUROLOGIC:  Cranial nerves II through XII appear to be intact.  Moves all extremities appropriately.  Sensation intact to light touch in the upper and lower extremities bilaterally.  PSYCHIATRIC:  The patient is awake, alert and oriented x3.  Mood and affect are normal and appropriate.    LABORATORY AND IMAGING DATA:  Reviewed above in HPI.    IMPRESSION AND PLAN:  Ms. Bailon is a 36-year-old female who was 35 weeks 6 days pregnant when she was admitted on 03/16/2022 for elevated BPP levels on ultrasound.  Ultimately, she was induced and delivered at approximately 10:00 p.m. on 03/17/2022.  Her medical history is notable for  diabetes mellitus and she was on insulin during her pregnancy.     Diabetes mellitus:  Diagnosed approximately 4 years ago, was on metformin prior to being pregnant.  Since delivery, blood sugars have been near normal.  Her last dose of insulin was approximately 24 hours ago.  She reports well controlled blood sugars during pregnancy and she was followed by an endocrinologist.    PLAN:    1.  Continue to monitor blood sugars.  I have ordered blood sugar checks q.a.c. and at bedtime.  Will check Hgb a1c level.    2.  Continue to hold insulin.  3.  Possible she may not require any insulin following her delivery.  Will closely track blood sugars and see what her numbers are over the next 24-48 hours while hospitalized.  4.  If diabetic control is needed, would resume insulin instead of resuming the metformin she was taking prior to pregnancy as directed by her endocrinologist.  5.  We will follow the patient while hospitalized and provide further recommendations based on her blood sugars.    Rios Brooke MD        D: 2022   T: 2022   MT: ARLEN    Name:     GODWIN ALONZO  MRN:      [id number]        Account:      [id number]   :      1985           Consult Date: 2022     Document: [id number]

## 2022-03-19 ENCOUNTER — LACTATION ENCOUNTER (OUTPATIENT)
Age: 37
End: 2022-03-19

## 2022-03-19 VITALS
HEART RATE: 78 BPM | SYSTOLIC BLOOD PRESSURE: 119 MMHG | DIASTOLIC BLOOD PRESSURE: 85 MMHG | TEMPERATURE: 98.2 F | RESPIRATION RATE: 16 BRPM

## 2022-03-19 PROCEDURE — 250N000013 HC RX MED GY IP 250 OP 250 PS 637: Performed by: OBSTETRICS & GYNECOLOGY

## 2022-03-19 RX ADMIN — DOCUSATE SODIUM 100 MG: 100 CAPSULE, LIQUID FILLED ORAL at 11:01

## 2022-03-19 ASSESSMENT — ACTIVITIES OF DAILY LIVING (ADL)
ADLS_ACUITY_SCORE: 3

## 2022-03-19 NOTE — PLAN OF CARE
Needing some assistance with breast feeding. Has not been using the nipple shield this shift. Patient's mother and  have been here at bedside and helping with cares. Ibuprofen for discomfort with reported control of pain. Monitor.

## 2022-03-19 NOTE — DISCHARGE SUMMARY
[facility]    Discharge Summary  Obstetrics    Date of Admission:  3/16/2022  Date of Discharge:  3/19/2022  Discharging Provider: Patti Lewis MD      Discharge Diagnoses   Patient Active Problem List   Diagnosis     Indication for care in labor and delivery, antepartum     H/O fetal biophysical profile     Poor fetal growth affecting management of mother in third trimester     Type 2 diabetes mellitus, with long-term current use of insulin (H)     Language barrier     Fetal growth retardation, antepartum      (spontaneous vaginal delivery)       History of Present Illness   Amber Bailon is a 36 year old female now  who presented to L&D @ 36w0d GA who presented with complaints of decreased fetal movemement. She had an MFM BPP that was 6/10 (fluid, brething and reactive NST). She had extended monitoring following a 4/8 BPP the day before. Pregnancy has been complicated by IUGR, T2DM on insulin, AMA, prolonged IPI, language barrier, Fetal PACs, hx of molar pregnancy. Ultimately decision to proceed with IOL was done.  Please see her admit H&P for full details of her PMH, PSH, Meds, Allergies and exam on admit.    Hospital Course   Amber Bailon is a 36 year old  who was admitted due to 6/10 BPP. This was repeated in 24 hours, and found to be unchanged. Additionally, there were abnormal MCA dopplers, and severe IUGR. The pregnancy was additionally complicated by A2 GDM, AMA, maternal obesity, GBS positive. Decision was made to move forward with induction, and she was found to be closed.  Rh positive, Covid negative. She was admitted to Labor and Delivery. She received cytotec x10, followed by pitocin, and AROM. Labor progressed, and nitrous and Fentanyl were administered. Internal monitors were placed, and  contractions were adequate. Rupture of membranes was artificial, and clear fluid was noted. She progressed to complete. She pushed effectively, for approximately 48  minutes. At 2143, she experienced  of a vigorous male , from an MACY position, over a first degree laceration. Repair was not needed.  APGARS 9/9. Pitocin was begun after delivery of the baby. The cord was cut at 120+ seconds, as it had ceased pulsing. A three vessel cord was noted. The placenta delivered spontaneously, and found to appear intact on inspection. QBL 50mL.    Her postpartum course was uncomplicated. On postpartum day 2, she was meeting all of her postpartum goals and deemed stable for discharge. She did not require insulin postpartum. Her BP remained normotensive without medications. She was voiding without difficulty, tolerating a regular diet without nausea and vomiting, her pain was well controlled on oral pain medicines and her lochia was appropriate.    Hgb:   Lab Results   Component Value Date    HGB 12.1 2022    HGB 12.8 2022       Lab Results   Component Value Date    RH Pos 2020    and rhogam was not indicated    She desired Nexplanon for contraception.    Instructions:  1) Call for temperature greater than 100.4F, foul smelling vaginal discharge, bleeding more than 1 pad per hour for 2 hrs, pain not controlled by oral pain meds, severe constipation or severe nausea or vomiting.  2) She was instructed to follow-up with her primary OB in 6 weeks for a routine postpartum visit or sooner if any problems  3) She was instructed to continue her PNV on discharge    Patti Lewis MD    Discharge Disposition   Discharged to home   Condition at discharge: Stable    Primary Care Physician   Alli Mascorro    Consultations This Hospital Stay   NURSE PRACT  IP CONSULT  HOSPITALIST IP CONSULT  ANESTHESIOLOGY IP CONSULT  CARE MANAGEMENT / SOCIAL WORK IP CONSULT  HOME CARE POST PARTUM/ IP CONSULT  LACTATION IP CONSULT    Discharge Orders      Breast pump - Manual/Electric    Breast Pump Documentation:  Manual/Electric Pump: To support adequate breast milk  production and nutrition for infant.     I, the undersigned, certify that the above prescribed supplies are medically necessary for this patient and is both reasonable and necessary in reference to accepted standards of medical and necessary in reference to accepted standards of medical practice in the treatment of this patient's condition and is not prescribed as a convenience.     Discharge Medications   Current Discharge Medication List      START taking these medications    Details   docusate sodium (COLACE) 100 MG capsule Take 1 capsule (100 mg) by mouth 2 times daily as needed for constipation  Qty: 60 capsule, Refills: 0    Associated Diagnoses:  (spontaneous vaginal delivery)      ibuprofen (ADVIL/MOTRIN) 800 MG tablet Take 1 tablet (800 mg) by mouth every 6 hours as needed for other (cramping)  Qty: 60 tablet, Refills: 0    Associated Diagnoses:  (spontaneous vaginal delivery)         CONTINUE these medications which have CHANGED    Details   acetaminophen (TYLENOL) 325 MG tablet Take 2 tablets (650 mg) by mouth every 6 hours as needed for mild pain or fever  Qty: 60 tablet, Refills: 0    Associated Diagnoses:  (spontaneous vaginal delivery)         CONTINUE these medications which have NOT CHANGED    Details   Prenatal Vit-Fe Fumarate-FA (PNV PRENATAL PLUS MULTIVITAMIN) 27-1 MG TABS per tablet Take 1 tablet by mouth daily         STOP taking these medications       aspirin (ASA) 81 MG chewable tablet Comments:   Reason for Stopping:         insulin lispro (HUMALOG VIAL) 100 UNIT/ML vial Comments:   Reason for Stopping:         insulin NPH (HUMULIN N/NOVOLIN N VIAL) 100 UNIT/ML vial Comments:   Reason for Stopping:             Allergies   No Known Allergies

## 2022-03-19 NOTE — PLAN OF CARE
Pt up and anastasia. Voiding without difficulty. Bonding well with baby, responding to infant cues. Breastfeeding with minimal assistance, bottle feeding for supplementation. Fundus firm and midline. PPD and BC completed. No ROP needed. Breast pump given per patient desire. Md recommends follow-up in 6 weeks. Hospitalist would like follow-up with Endo in 1-2 weeks, patient  is aware to continue to take blood sugars, no insulin require and record findings for appointment. Patient is to stay until baby discharges, understands rooming in with baby and that no food or services will be provided once discharged. Education completed. Discharge instructions explained and all questions and concerns answered. Discharged at 1125 .    Cassie Cornelius RN

## 2022-03-19 NOTE — PROGRESS NOTES
Hospitalist consult - brief note    Pt seen again today.  BS results reviewed and remain in an adequate range without the need for insulin.    Would continue to hold insulin on discharge.     Recommended to the patient that she continue to check BS several times a day and record, then bring results into next clinic visit with her endocrinologist in 1-2 weeks to reassess diabetic management.      Discharge anticipated today per primary service.

## 2022-03-19 NOTE — PROVIDER NOTIFICATION
03/19/22 0901   Provider Notification   Provider Name/Title Hospitalist    Method of Notification In Department   Request Evaluate in Person   Notification Reason Other   continue with blood sugars before meals and at hs per Hospitalist.    Cassie Cornelius RN

## 2022-03-19 NOTE — DISCHARGE INSTRUCTIONS
Please follow-up with your OBGYN in 6 weeks    Continue to take your blood sugars as prior to delivery, and record them. No insulin needed. Hospitalist would like you to follow-up with your Endocrinologist in 1- 2 weeks.        Vaginal Delivery Discharge Instructions: Persian  Actividad:     Pida a los miembros de amos mick y amigos que la ayuden cuando lo necesite.    No ponga nada en amos vagina hasta que amos médico lo permita.    Tómese las próximas semanas con calma para que amos cuerpo tenga tiempo de recuperarse. En noe momento puede hacer cualquier actividad que sienta que puede.    No conduzca si está tomando píldoras para el dolor recetadas por amos médico. Puede conducir si está tomando píldoras de venta jennifer para el dolor.    Llame a amos proveedor de atención médica si tiene alguno de estos síntomas:    Empapa ludy toalla femenina con marilyn en el correr de 1 hora o ve coágulos más grandes que ludy pelota de golf.    Sangrado que dura más de 6 semanas.    Tiene ludy secreción vaginal que huele mal.     Fiebre de 100.4  F (38  C) o más (temperatura tomada bajo amos lengua) con o sin escalofríos     Dolor, calambres o sensibilidad graves en la región inferior de amos vientre.    Aumento del dolor, hinchazón, enrojecimiento o líquido alrededor de sagar puntos.    Necesidad más frecuente o urgente de orinar (hacer pis), o ardor al hacerlo.    Enrojecimiento, hinchazón o dolor alrededor de ludy vena en amos pierna.    Problemas para amamantar o un área enrojecida o dolorosa en amos pecho.    Dolor que aumenta o no se va de ludy episiotomía o desgarro en el perineo.    Náuseas y vómitos    Dolor en el pecho y tos o dificultad para respirar.    Problemas para manejar la tristeza, ansiedad o depresión.     Si le preocupa hacerse daño o hacerle daño al bebé, llame al médico de inmediato.     Tiene preguntas o inquietudes después de regresar a casa.    Mantenga sagar anige limpias:  Lávese siempre las angie antes de tocar el área de amos  perineo y los puntos.  [place] ayuda a reducir amos riesgo de infección.  Si sagar angie no están sucias, puede usar un gel de alcohol para limpiarse las angie. Mantenga sagar uñas cortas y limpias.      Vaginal Delivery Discharge Instructions  Activity:     Ask family and friends for help when you need it.    Do not place anything in your vagina until your doctor approves.    Take it easy for the next few weeks to allow your body to recover. You may do any activities you feel up to at that point.    Do not drive while taking pain pills prescribed by your doctor. You may drive if taking over-the-counter pain pills.    Call your health care provider if you have any of these symptoms:    You soak a sanitary pad with blood within 1 hour, or you see blood clots larger than a golf ball.    Bleeding that lasts more than 6 weeks.    You have vaginal discharge that smells bad.     A fever of 100.4  F (38  C) or higher (temperature taken under your tongue), with or without chills     Severe, pain, cramping or tenderness in your lower belly area.    Increased pain, swelling, redness or fluid around your stitches.    A more frequent or urgent need to urinate (pee), or it burns when you pee.    Redness, swelling or pain around a vein in your leg.    Problems breastfeeding, or a red or painful area on your breast.    Pain that increases or does not go away from an episiotomy or perineal tear.    Nausea and vomiting.    Chest pain and cough or are gasping for air.    Problems coping with sadness, anxiety, or depression.     If you have any concerns about hurting yourself or the baby, call your doctor right away.     You have questions or concerns after you return home.    Keep your hands clean:  Always wash your hands before touching your perineal area and stitches.  This helps reduce your risk of infection.  If your hands aren t dirty, you may use an alcohol hand-rub to clean your hands. Keep your nails clean and short.

## 2022-03-19 NOTE — PROGRESS NOTES
[facility]   Post-Partum Note    Name:  Amber Bailon  MRN: [id number]    S: Patient doing well this AM. Mild perineal pain, pain well controlled on oral medications.  Tolerating regular diet without nausea or vomiting.  Ambulating without dizziness.  Lochia less than a typical period.  Breastfeeding.  Plans discharge today.    O:   Patient Vitals for the past 24 hrs:   BP Temp Temp src Pulse Resp   22 0913 119/85 98.2  F (36.8  C) Oral 78 16   22 0344 132/74 98.5  F (36.9  C) Oral 72 16   22 1723 116/67 98.5  F (36.9  C) Oral 61 18     Gen:  Resting comfortably, NAD  CV:  Regular rate  Pulm:  Breathing comfortably on room air   Abd:  Soft, non-distended.Fundus below umbilicus, firm and non-tender.  Ext:  non-tender, trace LE edema b/l    Hgb:   Recent Labs   Lab Test 22  0700   HGB 12.1     Assessment/Plan:  36 year old  on PPD #2 s/p  following IOL for fetal growth restriction with nonreassuring fetal surveillance, DM2, fetal PACs, class 1 obesity, history of molar pregnancy and AMA. She had 3 elevated BP during her hospital stay but has otherwise been normotensive.     DM2: hospitalist consulted, has not required insulin thus far, plan to follow-up with endocrinology postpartum  Elevated BP: UPC elevated with normal other preE labs, BP has been normotensive postpartum without antihypertensives  Hx of molar pregnancy: placenta sent to pathology  continue with routine postpartum management  Pain: Well-controlled with ibuprofen and tylenol  Hgb: 12.8>QBL 50> 12.1  Rh: positive  Rubella: immune  Feed: Breast, going well  BC: Nexplanon  Dispo: Plan for home today    Leah Henke, MD [facility]  22

## 2022-03-20 NOTE — LACTATION NOTE
"This note was copied from a baby's chart.  Lactation visit. Amber reports she just finished nursing baby on both sides and is planning on doing formula supplementation now. No latch observed. She reports baby is \"doing well\" at the breast and not as sleepy. Discussed benefits of pumping, she will consider for overnight but she is currently without a support person at bedside. Encouraged to call for latch assessment as able and made aware of lactation availability.   "

## 2022-03-21 LAB
PATH REPORT.COMMENTS IMP SPEC: NORMAL
PATH REPORT.COMMENTS IMP SPEC: NORMAL
PATH REPORT.FINAL DX SPEC: NORMAL
PATH REPORT.GROSS SPEC: NORMAL
PATH REPORT.MICROSCOPIC SPEC OTHER STN: NORMAL
PATH REPORT.RELEVANT HX SPEC: NORMAL
PHOTO IMAGE: NORMAL

## 2022-03-21 PROCEDURE — 88307 TISSUE EXAM BY PATHOLOGIST: CPT | Mod: 26 | Performed by: PATHOLOGY

## 2022-06-02 ENCOUNTER — HOSPITAL ENCOUNTER (EMERGENCY)
Facility: CLINIC | Age: 37
Discharge: HOME | End: 2022-06-02
Attending: INTERNAL MEDICINE | Admitting: INTERNAL MEDICINE
Payer: COMMERCIAL

## 2022-06-02 VITALS
RESPIRATION RATE: 18 BRPM | TEMPERATURE: 98.3 F | DIASTOLIC BLOOD PRESSURE: 90 MMHG | HEART RATE: 90 BPM | SYSTOLIC BLOOD PRESSURE: 129 MMHG | OXYGEN SATURATION: 99 %

## 2022-06-02 DIAGNOSIS — U07.1 INFECTION DUE TO 2019 NOVEL CORONAVIRUS: ICD-10-CM

## 2022-06-02 LAB
CREAT BLD-MCNC: 0.5 MG/DL (ref 0.5–1)
FLUAV RNA SPEC QL NAA+PROBE: NEGATIVE
FLUBV RNA RESP QL NAA+PROBE: NEGATIVE
GFR SERPL CREATININE-BSD FRML MDRD: >60 ML/MIN/1.73M2
RSV RNA SPEC NAA+PROBE: NEGATIVE
SARS-COV-2 RNA RESP QL NAA+PROBE: POSITIVE

## 2022-06-02 PROCEDURE — 99283 EMERGENCY DEPT VISIT LOW MDM: CPT | Mod: CS

## 2022-06-02 PROCEDURE — 87637 SARSCOV2&INF A&B&RSV AMP PRB: CPT | Performed by: INTERNAL MEDICINE

## 2022-06-02 PROCEDURE — 82565 ASSAY OF CREATININE: CPT

## 2022-06-02 ASSESSMENT — ENCOUNTER SYMPTOMS: SORE THROAT: 1

## 2022-06-02 NOTE — ED PROVIDER NOTES
"         MASSBP Score 6/2/2022   Age Greater than or equal to 65 years 0   BMI greater than or equal to 35 kg/m2 0   Has Diabetes Mellitus 2   Has Chronic Kidney Disease 0   Has Cardiovascular Disease and 55 years or older 0   Has Chronic Respiratory Disease and 55 years or older 0   Has Hypertension and 55 years or older 0   Is Immunocompromised 0   Is Pregnant 0   Member of [facility] community (Black/, /, ,  or , or  or [place] Native)  2   MASSBP Score 4   Has the patient had a positive COVID test outside our system?  No   What day did symptoms start?  6/1/2022       Estimated body mass index is 34.96 kg/m  as calculated from the following:    Height as of 3/15/22: 1.549 m (5' 1\").    Weight as of 3/15/22: 83.9 kg (185 lb).     GFR, ESTIMATED POCT   Date Value Ref Range Status   06/02/2022 >60 >60 mL/min/1.73m2 Final        FDA Facts Sheet  [facility] Drug Interaction review    edications were reviewed with the patient and held or adjusted where applicable.    No current facility-administered medications for this encounter.     Current Outpatient Medications   Medication     acetaminophen (TYLENOL) 325 MG tablet     docusate sodium (COLACE) 100 MG capsule     ibuprofen (ADVIL/MOTRIN) 800 MG tablet     Prenatal Vit-Fe Fumarate-FA (PNV PRENATAL PLUS MULTIVITAMIN) 27-1 MG TABS per tablet                                          Kelley Garcia MD  06/02/22 1426    "

## 2022-06-02 NOTE — ED TRIAGE NOTES
Son tested + for COVID. Pt endorses sore throat.      Triage Assessment     Row Name 06/02/22 1114       Triage Assessment (Adult)    Airway WDL WDL       Respiratory WDL    Respiratory WDL WDL       Skin Circulation/Temperature WDL    Skin Circulation/Temperature WDL WDL       Cardiac WDL    Cardiac WDL WDL       Peripheral/Neurovascular WDL    Peripheral Neurovascular WDL WDL       Cognitive/Neuro/Behavioral WDL    Cognitive/Neuro/Behavioral WDL WDL

## 2022-06-02 NOTE — ED PROVIDER NOTES
History     Chief Complaint:  Covid 19 Testing     HPI:  The history is provided by the patient.      Amber Bailon is a 36 year old female with a history of type II diabetes mellitus, hyperlipidemia who presents requesting COVID-19 testing. She reports that she had a known exposure to COVID-19 5 days ago and yesterday, she developed a sore throat. Her 2 month old son developed a fever and cough yesterday as well. Neither she nor her son were tested for COVID-19 prior to presenting to the ED today. She brought her son here to the ED this morning for evaluation, and when he had a positive COVID-19 test here, Amber requested to be tested as well so she is eligible to receive the antiviral treatment.    Review of Systems   HENT: Positive for sore throat.      Allergies:  The patient has no known allergies.     Medications:  Colace    Past Medical History:     Type II diabetes mellitus   Hyperlipidemia  Obesity  PCOS  Varicella    Past Surgical History:    D&C  Wrist surgery, right    Social History:  The patient presents to the ED with her son.  The patient presents to the ED via car.     Physical Exam     Patient Vitals for the past 24 hrs:   BP Temp Temp src Pulse Resp SpO2   06/02/22 1450 (!) 129/90 -- -- 90 18 99 %   06/02/22 1318 (!) 132/96 98.3  F (36.8  C) Oral 93 18 98 %     Physical Exam  Constitutional:       Comments: Pleasant and cooperative   HENT:      Mouth/Throat:      Pharynx: No posterior oropharyngeal erythema.   Eyes:      Conjunctiva/sclera: Conjunctivae normal.   Cardiovascular:      Rate and Rhythm: Normal rate and regular rhythm.      Heart sounds: Normal heart sounds.   Pulmonary:      Effort: Pulmonary effort is normal.      Breath sounds: Normal breath sounds.   Abdominal:      General: Bowel sounds are normal. There is no distension.      Palpations: Abdomen is soft.      Tenderness: There is no abdominal tenderness. There is no guarding or rebound.   Musculoskeletal:         General:  Normal range of motion.      Cervical back: Neck supple.   Skin:     General: Skin is warm and dry.   Neurological:      Mental Status: She is alert.           Emergency Department Course     Laboratory:  Labs Ordered and Resulted from Time of ED Arrival to Time of ED Departure   INFLUENZA A/B & SARS-COV2 PCR MULTIPLEX - Abnormal       Result Value    Influenza A PCR Negative      Influenza B PCR Negative      RSV PCR Negative      SARS CoV2 PCR Positive (*)    ISTAT CREATININE POCT - Normal    Creatinine POCT 0.5      GFR, ESTIMATED POCT >60       Emergency Department Course:       Reviewed:  I reviewed nursing notes, vitals, past medical history and Care Everywhere    Assessments:  1304 I obtained history and examined the patient as noted above.   1417 I rechecked the patient and explained findings.     Disposition:  The patient was discharged to home.     Impression & Plan     Medical Decision Making:  Amber Bailon is a 36 year old female who presents to the emergency department for evaluation of symptoms consistent with COVID-19 infection which is confirmed on testing.  Patient does qualify for treatment with PAXLOVID.  Discussed that this is approved under emergency use authorization.  After discussion of risks and benefits patient indicates that she would like to take this.    Diagnosis:    ICD-10-CM    1. Infection due to 2019 novel coronavirus  U07.1      Discharge Medications:  START taking these medications    Details   nirmatrelvir and ritonavir (PAXLOVID) therapy pack Take 2 tablets by mouth 2 times daily, Disp-30 each, R-0, E-PrescribeDate of symptom onset: 6/1/22; Risk criteria met: Yes; Positive Covid-19 test: Yes; Weight >40 kg Yes; Renal fxn: normal;  Drug-Drug interactions reviewed & addressed: Yes     Scribe Disclosure:  Debra SORTO, am serving as a scribe at 1:20 PM on 6/2/2022 to document services personally performed by Kelley Garcia MD based on my observations and the provider's  statements to me.      Kelley Garcia MD  06/19/22 0304

## 2023-12-03 ENCOUNTER — HOSPITAL ENCOUNTER (EMERGENCY)
Facility: CLINIC | Age: 38
Discharge: HOME | End: 2023-12-03
Attending: EMERGENCY MEDICINE | Admitting: EMERGENCY MEDICINE
Payer: COMMERCIAL

## 2023-12-03 VITALS
HEART RATE: 84 BPM | TEMPERATURE: 98.6 F | RESPIRATION RATE: 18 BRPM | WEIGHT: 163.36 LBS | DIASTOLIC BLOOD PRESSURE: 100 MMHG | OXYGEN SATURATION: 100 % | SYSTOLIC BLOOD PRESSURE: 136 MMHG | BODY MASS INDEX: 30.87 KG/M2

## 2023-12-03 DIAGNOSIS — H60.332 ACUTE SWIMMER'S EAR OF LEFT SIDE: ICD-10-CM

## 2023-12-03 DIAGNOSIS — R73.9 HYPERGLYCEMIA: ICD-10-CM

## 2023-12-03 LAB — GLUCOSE BLDC GLUCOMTR-MCNC: 225 MG/DL (ref 70–99)

## 2023-12-03 PROCEDURE — 82962 GLUCOSE BLOOD TEST: CPT

## 2023-12-03 PROCEDURE — 99283 EMERGENCY DEPT VISIT LOW MDM: CPT

## 2023-12-03 RX ORDER — OFLOXACIN 3 MG/ML
5 SOLUTION AURICULAR (OTIC) DAILY
Qty: 5 ML | Refills: 0 | Status: SHIPPED | OUTPATIENT
Start: 2023-12-03

## 2023-12-03 ASSESSMENT — ACTIVITIES OF DAILY LIVING (ADL): ADLS_ACUITY_SCORE: 33

## 2023-12-03 NOTE — ED TRIAGE NOTES
Pt arrives with right ear pain that after swimming on Friday. Pt states he mother looked in ear and saw something white. ABCS intact and Aox4.      Triage Assessment (Adult)       Row Name 12/03/23 1556          Triage Assessment    Airway WDL WDL        Respiratory WDL    Respiratory WDL WDL        Cardiac WDL    Cardiac WDL WDL

## 2023-12-03 NOTE — ED PROVIDER NOTES
History     Chief Complaint:  Otalgia       HPI   Amber Bailon is a 38 year old female who presents with ear pain.  Patient says that she was swimming over the weekend and had ear pain that started more recently.  She is concerned as her mother saw something white in her ear.  She denies any fall or trauma.  Denies any fevers or chills.  Reports a history of diabetes but noncompliant with her diabetes medications.  Denies any other new symptoms today.      Independent Historian:    none    Review of External Notes:  Reviewed visit from 5/12/2022 regarding postpartum cares.    Medications:    ofloxacin (FLOXIN) 0.3 % otic solution  acetaminophen (TYLENOL) 325 MG tablet  docusate sodium (COLACE) 100 MG capsule  ibuprofen (ADVIL/MOTRIN) 800 MG tablet  Prenatal Vit-Fe Fumarate-FA (PNV PRENATAL PLUS MULTIVITAMIN) 27-1 MG TABS per tablet        Past Medical History:    Past Medical History:   Diagnosis Date    Diabetes (H)     Infertility, female     Miscarriage        Past Surgical History:    Past Surgical History:   Procedure Laterality Date    DILATION AND CURETTAGE SUCTION WITH ULTRASOUND GUIDANCE N/A 2/14/2020    Procedure: DILATION AND CURETTAGE, UTERUS, USING SUCTION, WITH ULTRASOUND GUIDANCE;  Surgeon: Dunia Aranda MD;  Location: [facility] OR    ORTHOPEDIC SURGERY      right wrist surgery          Physical Exam   Patient Vitals for the past 24 hrs:   BP Temp Pulse Resp SpO2 Weight   12/03/23 1556 (!) 136/100 98.6  F (37  C) 84 18 100 % 74.1 kg (163 lb 5.8 oz)        Physical Exam  General: Resting on the bed.  Head: No obvious trauma to head.  Ears, Nose, Throat:  External ears normal.  TM clear on the left, partially obscured on the right.  Right external auditory canal has some erythema, exudate and discomfort with palpation.  No tenderness over the mastoid bone.  Nose normal.  No pharyngeal erythema, swelling or exudate.  Midline uvula.    Eyes:  Conjunctivae clear.  Pupils are equal, round, and  reactive.   Neck: Normal range of motion.  Neck supple.   CV: Regular rate and rhythm.  No murmurs.      Respiratory: Effort normal and breath sounds normal.  No wheezing or crackles.   Gastrointestinal: Soft.  No distension. There is no tenderness.    Neuro: Alert. Moving all extremities appropriately.  Normal speech.    Skin: Skin is warm and dry.  No rash noted.     Emergency Department Course     Laboratory:  Labs Ordered and Resulted from Time of ED Arrival to Time of ED Departure   GLUCOSE BY METER - Abnormal       Result Value    GLUCOSE BY METER POCT 225 (*)         Procedures       Emergency Department Course & Assessments:             Interventions:  Medications - No data to display     Assessments:  171 I met with patient, obtained history and performed examination.      Independent Interpretation (X-rays, CTs, rhythm strip):  None    Consultations/Discussion of Management or Tests:  ED Course as of 23 1736   Sun Dec 03, 2023   1731 I reviewed blood glucose with patient and offered workup.  She has been noncompliant with medications for present time.  She wishes to follow-up with her primary care doctor.  She has no symptoms presently.  This seems reasonable.       Social Determinants of Health affecting care:  Healthcare Access/Compliance patient is non compliant with diabetes medications      Disposition:  The patient was discharged to home.     Impression & Plan        Medical Decision Makin-year-old presents to the ER with ear pain.  Vital signs are reassuring.  Broad differentials pursued include not limited to otitis externa, otitis media, mastoiditis, malignant otitis externa, dehydration, cellulitis, abscess, etc.  Overall patient is well-appearing nontoxic.  Patient has exam concerning for otitis externa.  Recently swimming.  Difficult to visualize the TM although my suspicion for otitis media is low.  No signs of rupture or perforation.  No tenderness over the mastoid bone, not  suggestive mastoiditis.  Exam does not appear consistent with malignant otitis externa.  Rather I think this is a otitis externa swimmers ear.  No obvious abscess or cellulitis.  We will try topical drops.  Of note patient is a diabetic but noncompliant with her medications.  Sugars minimally elevated to 24 today.  Offered workup including blood work, A1c etc. patient declined.  She states that she would rather follow-up with her primary doctor.  Given that she is asymptomatic I think this is reasonable.  Return precautions provided.  Close follow-up is warranted.    Diagnosis:    ICD-10-CM    1. Acute swimmer's ear of left side  H60.332       2. Hyperglycemia  R73.9            Discharge Medications:  Discharge Medication List as of 12/3/2023  5:28 PM        START taking these medications    Details   ofloxacin (FLOXIN) 0.3 % otic solution Place 5 drops into the right ear daily, Disp-5 mL, R-0, E-Prescribe                12/3/2023   Cassie Clements MD Bennett, Jennifer L, MD  12/03/23 6544

## 2023-12-03 NOTE — DISCHARGE INSTRUCTIONS
"Please use topical eardrops.  If you notice increasing pain, discharge, fevers, swelling or other concerns return to the ER at any time.  Please follow-up with your primary doctor for ongoing management of your diabetes as well as repeat check.    Discharge Instructions  Otitis Externa    Today you were seen for otitis externa which is a condition that occurs when the ear canal, which is the area that leads from the outer ear to the ear drum, becomes irritated as a result of an infection, allergy, or skin problem.   The most common symptoms of this are:  pain in the outer ear, especially when the ear is moved, itchiness of the ear, fluid or pus leaking from the ear and difficulty hearing clearly.  \"Swimmer's ear\" is the name for external otitis that occurs in a person who swims frequently.    Generally, every Emergency Department visit should have a follow-up clinic visit with either a primary or a specialty clinic/provider. Please follow-up as instructed by your emergency provider today.    Return to the Emergency Department if:  Your symptoms get worse, or if you develop a severe headache, stiff neck, or new symptoms.  The pain and swelling spread to your face.  You develop high fever.      Treatment:  Treatment of otitis externa aims to reduce pain and eliminate the infection.   You should take either Advil  (ibuprofen) or Tylenol  (acetaminophen) for control of the ear pain.    Ear drops -- Ear drops are usually prescribed to both reduce pain and swelling and kill the bacteria which causes external otitis. It is important to apply the ear drops correctly so that they reach the ear canal:  Lie on your side or tilt your head towards the opposite shoulder.  Fill the ear canal with drops.  Lie on your side for 20 minutes or place a cotton ball in the ear canal for 20 minutes.  Finish the entire course of treatment, even if you begin to feel better within a few days.  Avoid getting ears wet -- during treatment, you " should avoid getting the inside of your ears wet. While showering, you can place a cotton ball coated with petroleum jelly in the ear. However, you should not swim for 7 to 10 days after starting treatment. Avoid wearing hearing aids and in-ear headphones until pain improves.  If a wick has been placed in your ear, please do not remove it until seen by your primary provider or Ear, Nose, and Throat (ENT) specialist as directed.    Prevention:  Do not clean ear canal. Ear wax serves to protect the ears from water, bacteria, and injury. Excessive cleaning or scratching can injure the skin, potentially leading to infection.  Swimming on a regular basis removes some of the ear wax, allowing water to soften the skin. Bacteria, which normally live in the ear canal, can then enter the skin more easily.  Wearing devices that block the ear canals, such as hearing aids, headphones, or ear plugs, can increase the risk of external otitis (if worn frequently) by injuring the skin.    If you swim frequently, experts recommend the following tips to reduce the chance of developing external otitis:  Shake your ears dry after swimming.  Use ear drops after swimming to prevent ear infections; these are available at most pharmacies without a prescription.  Consider wearing ear plugs made for swimming.  If you were given a prescription for medicine here today, be sure to read all of the information (including the package insert) that comes with your prescription.  This will include important information about the medicine, its side effects, and any warnings that you need to know about.  The pharmacist who fills the prescription can provide more information and answer questions you may have about the medicine.  If you have questions or concerns that the pharmacist cannot address, please call or return to the Emergency Department.   Remember that you can always come back to the Emergency Department if you are not able to see your regular  provider in the amount of time listed above, if you get any new symptoms, or if there is anything that worries you.    Discharge Instructions  Hyperglycemia, High Blood Sugar    Today we found your blood sugar (glucose) was high. This may mean that you have developed diabetes, or if you already know that you have diabetes, it may mean that your diabetes is not as well controlled as it should be. Sometimes blood sugar can be high temporarily and it is not diabetes. Signs of elevated blood sugar include increased thirst, frequent urination (peeing), blurred vision, fatigue, unexplained weight loss, poor wound healing, and frequent infections.    We sometimes give medicine in the Emergency Department to lower the blood sugar. We may also prescribe medicine for you to use at home, or increase the medicine that you already take. While we do not like to see your blood sugar high, it is much more dangerous to let your blood sugar get too low, so it is reasonable to take time to bring it down, or to wait and watch to see if it comes down on its own.    Generally, every Emergency Department visit should have a follow-up clinic visit with either a primary or a specialty clinic/provider. Please follow-up as instructed by your emergency provider today.     Return to the Emergency Department if you develop:  Vomiting (throwing up).  Confusion, disorientation, or being unable to wake up.  Severe weakness or illness.  Abdominal (belly) pain.    What can I do to help myself?  Check your blood sugar as instructed by your provider.  Take medications prescribed by your provider.  Follow a diabetic diet (low fat, low concentrated sweets, high fiber).  Exercise regularly.  Moderate or eliminate alcohol use.  Stop smoking.    Diabetes: Diabetes mellitus is a disease in which the body cannot regulate the amount of sugar (glucose) in the blood. Insulin allows glucose to move out of the blood into cells throughout the body where it is used  for fuel. People with diabetes do not produce enough insulin (type 1 diabetes), or cannot use insulin properly (type 2 diabetes), or both. This starves the cells that need the glucose for fuel, and also harms certain organs and tissues exposed to the high glucose levels.  Over a long period of time, uncontrolled diabetes can lead to heart and blood vessel disease, blindness, kidney failure, foot ulcers and many other problems.          About 17 million Americans (6.2% of adults) have diabetes. We think that about one third of adults with diabetes do not know they have diabetes.  The incidence of diabetes is increasing rapidly. This increase is due to many factors, but the most significant are our increasing weight and decreased activity levels.     Diabetes can be a very serious and life-threatening illness if not treated.  If you were given a prescription for medicine here today, be sure to read all of the information (including the package insert) that comes with your prescription.  This will include important information about the medicine, its side effects, and any warnings that you need to know about.  The pharmacist who fills the prescription can provide more information and answer questions you may have about the medicine.  If you have questions or concerns that the pharmacist cannot address, please call or return to the Emergency Department.   Remember that you can always come back to the Emergency Department if you are not able to see your regular provider in the amount of time listed above, if you get any new symptoms, or if there is anything that worries you.

## 2024-06-26 ENCOUNTER — HOSPITAL ENCOUNTER (EMERGENCY)
Facility: CLINIC | Age: 39
Discharge: HOME | End: 2024-06-26
Attending: EMERGENCY MEDICINE | Admitting: EMERGENCY MEDICINE
Payer: COMMERCIAL

## 2024-06-26 ENCOUNTER — APPOINTMENT (OUTPATIENT)
Dept: CT IMAGING | Facility: CLINIC | Age: 39
End: 2024-06-26
Attending: EMERGENCY MEDICINE
Payer: COMMERCIAL

## 2024-06-26 VITALS
HEIGHT: 61 IN | TEMPERATURE: 98.1 F | BODY MASS INDEX: 30.84 KG/M2 | RESPIRATION RATE: 18 BRPM | DIASTOLIC BLOOD PRESSURE: 94 MMHG | HEART RATE: 98 BPM | OXYGEN SATURATION: 100 % | WEIGHT: 163.36 LBS | SYSTOLIC BLOOD PRESSURE: 149 MMHG

## 2024-06-26 DIAGNOSIS — K50.00 TERMINAL ILEITIS WITHOUT COMPLICATION (H): ICD-10-CM

## 2024-06-26 LAB
ALBUMIN SERPL BCG-MCNC: 4.2 G/DL (ref 3.5–5.2)
ALBUMIN UR-MCNC: NEGATIVE MG/DL
ALP SERPL-CCNC: 94 U/L (ref 40–150)
ALT SERPL W P-5'-P-CCNC: 39 U/L (ref 0–50)
AMORPH CRY #/AREA URNS HPF: ABNORMAL /HPF
ANION GAP SERPL CALCULATED.3IONS-SCNC: 12 MMOL/L (ref 7–15)
APPEARANCE UR: ABNORMAL
AST SERPL W P-5'-P-CCNC: 28 U/L (ref 0–45)
BACTERIA #/AREA URNS HPF: ABNORMAL /HPF
BASOPHILS # BLD AUTO: 0 10E3/UL (ref 0–0.2)
BASOPHILS NFR BLD AUTO: 0 %
BILIRUB SERPL-MCNC: 0.4 MG/DL
BILIRUB UR QL STRIP: NEGATIVE
BUN SERPL-MCNC: 8.4 MG/DL (ref 6–20)
CALCIUM SERPL-MCNC: 9.7 MG/DL (ref 8.6–10)
CAOX CRY #/AREA URNS HPF: ABNORMAL /HPF
CHLORIDE SERPL-SCNC: 99 MMOL/L (ref 98–107)
COLOR UR AUTO: ABNORMAL
CREAT SERPL-MCNC: 0.46 MG/DL (ref 0.51–0.95)
DEPRECATED HCO3 PLAS-SCNC: 22 MMOL/L (ref 22–29)
EGFRCR SERPLBLD CKD-EPI 2021: >90 ML/MIN/1.73M2
EOSINOPHIL # BLD AUTO: 0.2 10E3/UL (ref 0–0.7)
EOSINOPHIL NFR BLD AUTO: 2 %
ERYTHROCYTE [DISTWIDTH] IN BLOOD BY AUTOMATED COUNT: 12.5 % (ref 10–15)
GLUCOSE SERPL-MCNC: 254 MG/DL (ref 70–99)
GLUCOSE UR STRIP-MCNC: 1000 MG/DL
HCG SERPL QL: NEGATIVE
HCT VFR BLD AUTO: 39.2 % (ref 35–47)
HGB BLD-MCNC: 13.6 G/DL (ref 11.7–15.7)
HGB UR QL STRIP: ABNORMAL
IMM GRANULOCYTES # BLD: 0 10E3/UL
IMM GRANULOCYTES NFR BLD: 0 %
KETONES UR STRIP-MCNC: NEGATIVE MG/DL
LEUKOCYTE ESTERASE UR QL STRIP: ABNORMAL
LYMPHOCYTES # BLD AUTO: 1.8 10E3/UL (ref 0.8–5.3)
LYMPHOCYTES NFR BLD AUTO: 23 %
MCH RBC QN AUTO: 31.9 PG (ref 26.5–33)
MCHC RBC AUTO-ENTMCNC: 34.7 G/DL (ref 31.5–36.5)
MCV RBC AUTO: 92 FL (ref 78–100)
MONOCYTES # BLD AUTO: 0.7 10E3/UL (ref 0–1.3)
MONOCYTES NFR BLD AUTO: 10 %
NEUTROPHILS # BLD AUTO: 5.1 10E3/UL (ref 1.6–8.3)
NEUTROPHILS NFR BLD AUTO: 65 %
NITRATE UR QL: NEGATIVE
NRBC # BLD AUTO: 0 10E3/UL
NRBC BLD AUTO-RTO: 0 /100
PH UR STRIP: 5.5 [PH] (ref 5–7)
PLATELET # BLD AUTO: 291 10E3/UL (ref 150–450)
POTASSIUM SERPL-SCNC: 3.7 MMOL/L (ref 3.4–5.3)
PROT SERPL-MCNC: 7.6 G/DL (ref 6.4–8.3)
RBC # BLD AUTO: 4.26 10E6/UL (ref 3.8–5.2)
RBC URINE: 4 /HPF
SODIUM SERPL-SCNC: 133 MMOL/L (ref 135–145)
SP GR UR STRIP: 1.01 (ref 1–1.03)
SQUAMOUS EPITHELIAL: 3 /HPF
UROBILINOGEN UR STRIP-MCNC: NORMAL MG/DL
WBC # BLD AUTO: 7.8 10E3/UL (ref 4–11)
WBC URINE: 15 /HPF

## 2024-06-26 PROCEDURE — 250N000009 HC RX 250: Performed by: EMERGENCY MEDICINE

## 2024-06-26 PROCEDURE — 84703 CHORIONIC GONADOTROPIN ASSAY: CPT | Performed by: EMERGENCY MEDICINE

## 2024-06-26 PROCEDURE — 250N000013 HC RX MED GY IP 250 OP 250 PS 637: Performed by: EMERGENCY MEDICINE

## 2024-06-26 PROCEDURE — 87086 URINE CULTURE/COLONY COUNT: CPT | Performed by: EMERGENCY MEDICINE

## 2024-06-26 PROCEDURE — 36415 COLL VENOUS BLD VENIPUNCTURE: CPT | Performed by: EMERGENCY MEDICINE

## 2024-06-26 PROCEDURE — 250N000011 HC RX IP 250 OP 636: Performed by: EMERGENCY MEDICINE

## 2024-06-26 PROCEDURE — 80053 COMPREHEN METABOLIC PANEL: CPT | Performed by: EMERGENCY MEDICINE

## 2024-06-26 PROCEDURE — 74177 CT ABD & PELVIS W/CONTRAST: CPT

## 2024-06-26 PROCEDURE — 85025 COMPLETE CBC W/AUTO DIFF WBC: CPT | Performed by: EMERGENCY MEDICINE

## 2024-06-26 PROCEDURE — 99285 EMERGENCY DEPT VISIT HI MDM: CPT | Mod: 25

## 2024-06-26 PROCEDURE — 81001 URINALYSIS AUTO W/SCOPE: CPT | Performed by: EMERGENCY MEDICINE

## 2024-06-26 RX ORDER — IOPAMIDOL 755 MG/ML
500 INJECTION, SOLUTION INTRAVASCULAR ONCE
Status: COMPLETED | OUTPATIENT
Start: 2024-06-26 | End: 2024-06-26

## 2024-06-26 RX ORDER — DICYCLOMINE HCL 20 MG
20 TABLET ORAL 4 TIMES DAILY PRN
Qty: 20 TABLET | Refills: 0 | Status: SHIPPED | OUTPATIENT
Start: 2024-06-26 | End: 2024-07-06

## 2024-06-26 RX ORDER — MAGNESIUM HYDROXIDE/ALUMINUM HYDROXICE/SIMETHICONE 120; 1200; 1200 MG/30ML; MG/30ML; MG/30ML
15 SUSPENSION ORAL ONCE
Status: COMPLETED | OUTPATIENT
Start: 2024-06-26 | End: 2024-06-26

## 2024-06-26 RX ADMIN — IOPAMIDOL 82 ML: 755 INJECTION, SOLUTION INTRAVENOUS at 21:50

## 2024-06-26 RX ADMIN — SODIUM CHLORIDE 62 ML: 9 INJECTION, SOLUTION INTRAVENOUS at 21:50

## 2024-06-26 RX ADMIN — ALUMINUM HYDROXIDE, MAGNESIUM HYDROXIDE, AND SIMETHICONE 15 ML: 1200; 120; 1200 SUSPENSION ORAL at 20:58

## 2024-06-26 ASSESSMENT — ACTIVITIES OF DAILY LIVING (ADL)
ADLS_ACUITY_SCORE: 35
ADLS_ACUITY_SCORE: 35

## 2024-06-26 ASSESSMENT — COLUMBIA-SUICIDE SEVERITY RATING SCALE - C-SSRS
2. HAVE YOU ACTUALLY HAD ANY THOUGHTS OF KILLING YOURSELF IN THE PAST MONTH?: NO
6. HAVE YOU EVER DONE ANYTHING, STARTED TO DO ANYTHING, OR PREPARED TO DO ANYTHING TO END YOUR LIFE?: NO
1. IN THE PAST MONTH, HAVE YOU WISHED YOU WERE DEAD OR WISHED YOU COULD GO TO SLEEP AND NOT WAKE UP?: NO

## 2024-06-27 NOTE — ED PROVIDER NOTES
"  Emergency Department Note      History of Present Illness     Chief Complaint   Abdominal Pain    HPI   Amber Perry is a 38 year old female with a history of type 2 diabetes mellitus and hyperlipidemia who presents to the ED for evaluation of abdominal pain. The patient reports that she started having intermittent abdominal pain on Sunday after eating a salad. The pain occurs every 10-15 minutes. The pain is worse today and she now has had 5-6 episodes of diarrhea. Every time she eats, the pain gets worse. When the pain is gone, she still feels nauseous. She has been taking pepto bismol and aris-seltzer, but they haven't helped. She denies having dysuria or fevers. She has never had any abdominal surgeries. She has no history of heart burn. The patient denies taking any daily medications.     Independent Historian   None    Review of External Notes   none    Past Medical History   Medical History and Problem List   Type 2 diabetes mellitus  Female infertility  Miscarriage  Obesity  Hyperlipidemia   PCOS  Varicella     Medications   The patient is not currently taking any prescribed medications.    Surgical History   Dilation and curettage using suction  Right wrist surgery    Physical Exam     Patient Vitals for the past 24 hrs:   BP Temp Temp src Pulse Resp SpO2 Height Weight   06/26/24 1954 (!) 149/94 98.1  F (36.7  C) Oral 98 18 100 % 1.549 m (5' 1\") 74.1 kg (163 lb 5.8 oz)     Physical Exam  Nursing note and vitals reviewed.  HENT:   Mouth/Throat: Moist mucous membranes.   Eyes: EOMI, nonicteric sclera  Cardiovascular: Normal rate, regular rhythm, no murmurs, rubs, or gallops  Pulmonary/Chest: Effort normal and breath sounds normal. No respiratory distress. No wheezes. No rales.   Abdominal: Soft. Generalized TTP, Nontender, nondistended, no guarding or rigidity.   Musculoskeletal: Normal range of motion.   Neurological: Alert. Moves all extremities spontaneously.   Skin: Skin is warm and dry. No rash noted. "         Diagnostics     Lab Results   Labs Ordered and Resulted from Time of ED Arrival to Time of ED Departure   COMPREHENSIVE METABOLIC PANEL - Abnormal       Result Value    Sodium 133 (*)     Potassium 3.7      Carbon Dioxide (CO2) 22      Anion Gap 12      Urea Nitrogen 8.4      Creatinine 0.46 (*)     GFR Estimate >90      Calcium 9.7      Chloride 99      Glucose 254 (*)     Alkaline Phosphatase 94      AST 28      ALT 39      Protein Total 7.6      Albumin 4.2      Bilirubin Total 0.4     ROUTINE UA WITH MICROSCOPIC REFLEX TO CULTURE - Abnormal    Color Urine Light Yellow      Appearance Urine Slightly Cloudy (*)     Glucose Urine 1000 (*)     Bilirubin Urine Negative      Ketones Urine Negative      Specific Gravity Urine 1.007      Blood Urine Trace (*)     pH Urine 5.5      Protein Albumin Urine Negative      Urobilinogen Urine Normal      Nitrite Urine Negative      Leukocyte Esterase Urine Moderate (*)     Bacteria Urine Few (*)     Amorphous Crystals Urine Few (*)     Calcium Oxalate Crystals Urine Few (*)     RBC Urine 4 (*)     WBC Urine 15 (*)     Squamous Epithelials Urine 3 (*)    HCG QUALITATIVE PREGNANCY - Normal    hCG Serum Qualitative Negative     CBC WITH PLATELETS AND DIFFERENTIAL    WBC Count 7.8      RBC Count 4.26      Hemoglobin 13.6      Hematocrit 39.2      MCV 92      MCH 31.9      MCHC 34.7      RDW 12.5      Platelet Count 291      % Neutrophils 65      % Lymphocytes 23      % Monocytes 10      % Eosinophils 2      % Basophils 0      % Immature Granulocytes 0      NRBCs per 100 WBC 0      Absolute Neutrophils 5.1      Absolute Lymphocytes 1.8      Absolute Monocytes 0.7      Absolute Eosinophils 0.2      Absolute Basophils 0.0      Absolute Immature Granulocytes 0.0      Absolute NRBCs 0.0     URINE CULTURE       Imaging   CT Abdomen Pelvis w Contrast   Final Result   IMPRESSION:    1.  Long segment distal and terminal ileitis, likely infectious or inflammatory.      2.  Trace  pelvic free fluid, either reactive or physiologic.        Independent Interpretation   None    ED Course      Medications Administered   Medications   alum & mag hydroxide-simethicone (MAALOX) suspension 15 mL (15 mLs Oral $Given 6/26/24 2058)   CT scan flush (62 mLs Intravenous $Given 6/26/24 2150)   iopamidol (ISOVUE-370) solution 500 mL (82 mLs Intravenous $Given 6/26/24 2150)         Discussion of Management   None    Social Determinants of Health adding to complexity of care   None    ED Course   ED Course as of 06/26/24 2249 Wed Jun 26, 2024 2030 I obtained history and examined the patient as noted above.    2247 I rechecked the patient and explained findings.        Medical Decision Making / Diagnosis     GE Perry is a 38 year old female who presents with abdominal pain. I considered a broad differential including  diverticulitis, colitis, appendicitis, functional bowel disease, constipation, UTI, GIB, pyelonephritis, ureterolithiasis, hernia, etc.  Rare and serious causes were considered as well in this patient such as volvulus, abscess, aneurysmal disease, mesenteric ischemia, etc. The workup in the ED is consistent with ileitis.  The differential of this includes ischemic, bacterial, idiopathic, autoimmune, etc. Given time course, most likely is viral. The patient looks well and this point with a reassuring exam so I will not therefore admit for serial exams and further workup.  Patient was counseled on natural history of ileitis and possibility of progression to abscess and/or sepsis depending on reason for the ileitis. She is in stable condition at the time of discharge, red flags that should merit ED return were discussed as well as recommended follow-up instructions. All questions were answered and she is in agreement with the plan.      Disposition   The patient was discharged.     Diagnosis     ICD-10-CM    1. Terminal ileitis without complication (H)  K50.00            Discharge  Medications   Discharge Medication List as of 6/26/2024 10:56 PM        START taking these medications    Details   dicyclomine (BENTYL) 20 MG tablet Take 1 tablet (20 mg) by mouth 4 times daily as needed (abdominal pain/cramping), Disp-20 tablet, R-0, E-Prescribe           Scribe Disclosure:  I, Richar Santiago, am serving as a scribe at 8:56 PM on 6/26/2024 to document services personally performed by Myron Reilly MD, based on my observations and the provider's statements to me.        Myron Reilly MD  06/30/24 [id number]

## 2024-06-27 NOTE — ED TRIAGE NOTES
Patient reports intermittent abdominal pain that started Sunday after she ate a salad. The patient states the pain comes and goes and is a 10/10 at it's worst.  Patient denies nausea and vomiting.      Triage Assessment (Adult)       Row Name 06/26/24 1952          Triage Assessment    Airway WDL WDL        Respiratory WDL    Respiratory WDL WDL        Skin Circulation/Temperature WDL    Skin Circulation/Temperature WDL WDL        Cardiac WDL    Cardiac WDL WDL        Peripheral/Neurovascular WDL    Peripheral Neurovascular WDL WDL        Cognitive/Neuro/Behavioral WDL    Cognitive/Neuro/Behavioral WDL WDL

## 2024-06-27 NOTE — DISCHARGE INSTRUCTIONS
Your symptoms today are due to inflammation at the end of your small intestine called ileitis.  This is most commonly caused by a viral infection.  This is more rarely caused by inflammatory bowel disease such as Crohn's disease.  If this is a viral infection, symptoms should resolve on their own in the next few days.  Follow-up with your doctor in about a week for recheck of your symptoms.  If your symptoms are not improving, or if you develop blood in your stool, you may need to see a GI specialist for further testing.  Return to emergency department for fevers, dehydration, uncontrollable pain, blood in stool, or for any other concerns.

## 2024-06-28 LAB — BACTERIA UR CULT: NORMAL

## 2024-08-09 ENCOUNTER — LAB REQUISITION (OUTPATIENT)
Dept: LAB | Facility: CLINIC | Age: 39
End: 2024-08-09

## 2024-08-09 PROCEDURE — 86481 TB AG RESPONSE T-CELL SUSP: CPT | Performed by: FAMILY MEDICINE

## 2024-08-11 LAB
GAMMA INTERFERON BACKGROUND BLD IA-ACNC: 0.22 IU/ML
M TB IFN-G BLD-IMP: NEGATIVE
M TB IFN-G CD4+ BCKGRND COR BLD-ACNC: 9.78 IU/ML
MITOGEN IGNF BCKGRD COR BLD-ACNC: -0.01 IU/ML
MITOGEN IGNF BCKGRD COR BLD-ACNC: 0.01 IU/ML
QUANTIFERON MITOGEN: 10 IU/ML
QUANTIFERON NIL TUBE: 0.22 IU/ML
QUANTIFERON TB1 TUBE: 0.21 IU/ML
QUANTIFERON TB2 TUBE: 0.23

## (undated) DEVICE — LINEN DRAPE 54X72" 5467

## (undated) DEVICE — LINEN TOWEL PACK X5 5464

## (undated) DEVICE — SPECIMEN TRAP VACUUM SUCTION 003984-901

## (undated) DEVICE — GLOVE PROTEXIS POWDER FREE SMT 6.5  2D72PT65X

## (undated) DEVICE — FILTER BERKLEY SAFETOUCH

## (undated) DEVICE — CATH INTERMITTENT CLEAN-CATH FEMALE 14FR 6" VINYL LF 420614

## (undated) DEVICE — LINEN HALF SHEET 5512

## (undated) DEVICE — BAG CLEAR TRASH 1.3M 39X33" P4040C

## (undated) DEVICE — SOL NACL 0.9% IRRIG 1000ML BOTTLE 2F7124

## (undated) DEVICE — PAD CHUX UNDERPAD 30X36" P3036C

## (undated) DEVICE — SUCTION CANNULA UTERINE 09MM CVD  21552

## (undated) DEVICE — SUCTION CANNULA UTERINE 10MM CVD  21553

## (undated) DEVICE — PACK MINOR LITHOTOMY RIDGES

## (undated) DEVICE — LINEN FULL SHEET 5511

## (undated) DEVICE — GLOVE PROTEXIS BLUE W/NEU-THERA 7.0  2D73EB70

## (undated) DEVICE — TUBING SUCTION VACUUM COLLECTION 6FT 610

## (undated) RX ORDER — LIDOCAINE HYDROCHLORIDE 10 MG/ML
INJECTION, SOLUTION EPIDURAL; INFILTRATION; INTRACAUDAL; PERINEURAL
Status: DISPENSED
Start: 2020-02-14

## (undated) RX ORDER — PROPOFOL 10 MG/ML
INJECTION, EMULSION INTRAVENOUS
Status: DISPENSED
Start: 2020-02-14

## (undated) RX ORDER — ACETAMINOPHEN 325 MG/1
TABLET ORAL
Status: DISPENSED
Start: 2020-02-14

## (undated) RX ORDER — FENTANYL CITRATE 50 UG/ML
INJECTION, SOLUTION INTRAMUSCULAR; INTRAVENOUS
Status: DISPENSED
Start: 2020-02-14

## (undated) RX ORDER — DEXAMETHASONE SODIUM PHOSPHATE 4 MG/ML
INJECTION, SOLUTION INTRA-ARTICULAR; INTRALESIONAL; INTRAMUSCULAR; INTRAVENOUS; SOFT TISSUE
Status: DISPENSED
Start: 2020-02-14

## (undated) RX ORDER — ONDANSETRON 2 MG/ML
INJECTION INTRAMUSCULAR; INTRAVENOUS
Status: DISPENSED
Start: 2020-02-14

## (undated) RX ORDER — GLYCOPYRROLATE 0.2 MG/ML
INJECTION INTRAMUSCULAR; INTRAVENOUS
Status: DISPENSED
Start: 2020-02-14

## (undated) RX ORDER — OXYCODONE HYDROCHLORIDE 5 MG/1
TABLET ORAL
Status: DISPENSED
Start: 2020-02-14

## (undated) RX ORDER — DOXYCYCLINE 100 MG/10ML
INJECTION, POWDER, LYOPHILIZED, FOR SOLUTION INTRAVENOUS
Status: DISPENSED
Start: 2020-02-14